# Patient Record
Sex: MALE | Race: WHITE | NOT HISPANIC OR LATINO | Employment: OTHER | URBAN - METROPOLITAN AREA
[De-identification: names, ages, dates, MRNs, and addresses within clinical notes are randomized per-mention and may not be internally consistent; named-entity substitution may affect disease eponyms.]

---

## 2018-09-13 ENCOUNTER — OFFICE VISIT (OUTPATIENT)
Dept: FAMILY MEDICINE | Facility: CLINIC | Age: 68
End: 2018-09-13

## 2018-09-13 VITALS
HEIGHT: 71 IN | SYSTOLIC BLOOD PRESSURE: 144 MMHG | DIASTOLIC BLOOD PRESSURE: 76 MMHG | BODY MASS INDEX: 20.16 KG/M2 | HEART RATE: 80 BPM | RESPIRATION RATE: 16 BRPM | WEIGHT: 144 LBS

## 2018-09-13 DIAGNOSIS — M79.5 FOREIGN BODY (FB) IN SOFT TISSUE: Primary | ICD-10-CM

## 2018-09-13 PROCEDURE — 10121 INC&RMVL FB SUBQ TISS COMP: CPT | Performed by: FAMILY MEDICINE

## 2018-09-13 NOTE — MR AVS SNAPSHOT
"              After Visit Summary   2018    Max Phelps    MRN: [id number]           Patient Information     Date Of Birth          1950        Visit Information        Provider Department      2018 4:30 PM Nayan Samuel MD [facility]        Today's Diagnoses     Foreign body (FB) in soft tissue    -  1       Follow-ups after your visit        Who to contact     If you have questions or need follow up information about today's clinic visit or your schedule please contact [facility] directly at [phone].  Normal or non-critical lab and imaging results will be communicated to you by AltaVitashart, letter or phone within 4 business days after the clinic has received the results. If you do not hear from us within 7 days, please contact the clinic through Wordyt or phone. If you have a critical or abnormal lab result, we will notify you by phone as soon as possible.  Submit refill requests through DataMentors or call your pharmacy and they will forward the refill request to us. Please allow 3 business days for your refill to be completed.          Additional Information About Your Visit        MyChart Information     Data[employer]ent[employer] lets you send messages to your doctor, view your test results, renew your prescriptions, schedule appointments and more. To sign up, go to [email or web address] . Click on \"Log in\" on the left side of the screen, which will take you to the Welcome page. Then click on \"Sign up Now\" on the right side of the page.     You will be asked to enter the access code listed below, as well as some personal information. Please follow the directions to create your username and password.     Your access code is: [email or web address]  Expires: 2018  3:33 PM     Your access code will  in 90 days. If you need help or a new code, please call your [place] clinic or [phone].        Care EveryWhere ID     This is your Care EveryWhere ID. This could be used by " "other organizations to access your [place] medical records  [id number]        Your Vitals Were     Pulse Respirations Height BMI (Body Mass Index)          80 16 1.803 m (5' 11\") 20.08 kg/m2         Blood Pressure from Last 3 Encounters:   09/13/18 144/76   11/13/15 132/84   10/11/13 156/78    Weight from Last 3 Encounters:   09/13/18 65.3 kg (144 lb)   11/13/15 67.6 kg (149 lb)   10/11/13 64.4 kg (142 lb)              We Performed the Following     REMOVE FOREIGN BODY COMPLICATED [[id number]]        Primary Care Provider Office Phone # Fax #    Nayan Samuel [phone] [address]  [facility] [id number]        Equal Access to Services     [facility]: Hadii aad ku hadasho Soomaali, waaxda luqadaha, qaybta kaalmada adeegyada, mary ellen ponce haygualberto brewster . So [facility] [phone].    ATENCIÓN: Si habla español, tiene a fisher disposición servicios gratuitos de asistencia lingüística. Jose al [phone].    We comply with applicable federal civil rights laws and [place] laws. We do not discriminate on the basis of race, color, national origin, age, disability, sex, sexual orientation, or gender identity.            Thank you!     Thank you for choosing [facility]  for your care. Our goal is always to provide you with excellent care. Hearing back from our patients is one way we can continue to improve our services. Please take a few minutes to complete the written survey that you may receive in the mail after your visit with us. Thank you!             Your Updated Medication List - Protect others around you: Learn how to safely use, store and throw away your medicines at www.disposemymeds.org.          This list is accurate as of 9/13/18 11:59 PM.  Always use your most recent med list.                   Brand Name Dispense Instructions for use Diagnosis    ASPIRIN PO      Take 81 mg by mouth five times a week          "

## 2018-09-14 NOTE — PROGRESS NOTES
Subjective: Max Phelps a 68 year old male who presents today for lesion removal. The lesion is located on the right palm,  and measures 2mm.  He denies other significant symptoms on ROS. Medications reviewed.    Objective: The area was prepped and appropriately anesthetized. Using the usual technique, removal of foreign body was performed.  An appropriate  dressing was applied.  The procedure was well tolerated and without complications. Specimen was not sent.    Assessment: foreign body    Plan: Follow up: The patient may return prn. Wound care instructions provided.  Patient was instructed to be alert for any signs of cutaneous infection.

## 2018-10-21 NOTE — PROGRESS NOTES
Problem(s) Oriented visit      SUBJECTIVE:                                                    Max Phelps is a 68 year old male who presents to clinic today for:  Patient presents with:  Hand Problem: Pt states he had gotten glass in his hand in Sept.- Having sharp pain in the palm area, especially when touched     Pt usually sees PCP Dr. Nayan Samuel. He is not available today. Pt is new to me today.     Pt was seen by his PCP on 9/13/2018 for foreign body vs 2 mm lesion removal from his R palm.     Since then, still feels as though there is something left in his hand. It feels rough when he runs his hand over the site. He cannot see if there is a retained foreign body. Has not tried soaking it yet. No erythema. No edema. Some mild localized pain present especially when grasping items.     Problem list, Medication list, Allergies, and Medical/Social/Surgical histories reviewed in EPIC and updated as appropriate.     ROS:  5 point ROS completed and negative except noted above, including Gen, CV, Resp, GI, MS    Histories:   Patient Active Problem List   Diagnosis     Problem with, hearing     No past medical history on file.  Past Surgical History:   Procedure Laterality Date     HERNIA REPAIR       Social History     Tobacco Use     Smoking status: Current Every Day Smoker     Packs/day: 1.30     Types: Cigarettes     Smokeless tobacco: Never Used   Substance Use Topics     Alcohol use: No     Alcohol/week: 0.0 oz     Comment: 10/19/85     No family history on file.    OBJECTIVE:                                                    /80 (BP Location: Right arm, Patient Position: Sitting, Cuff Size: Adult Large)   Pulse 84   Resp 24   Wt 66.2 kg (146 lb)   BMI 20.36 kg/m    Body mass index is 20.36 kg/m .   Gen: Cooperative. No acute distress. Very pleasant. Neatly dressed; well groomed.    Musculoskeletal: Demonstrates full function of R hand.   Skin: Warm and well perfused. There is a very small sharp  edge palpable over the scar from prior foreign body removal on palm of R hand.   Neurologic: Alert, oriented x3, nonfocal. Sensation intact in R hand.      ASSESSMENT/PLAN:                                                      Max was seen today for hand problem.    Diagnoses and all orders for this visit:    Foreign body of right hand, subsequent encounter   After obtaining verbal consent, cleaned the skin over site of prior excision.    We proceeded w/ injecting lido because fearful we would lose the foreign body when the skin raises after injection. He was instructed to let me know at any time if he wanted to abandon procedure.    Gently scraped at skin w/ #11 blade - we could hear foreign body clicking against scalpel blade.   Located site of foreign body and removed < 1 mm of shiny clear object, consistent w/ sliver of glass. Pt inspected the removed object. The shard was discarded.   Pt tolerated process very well.    Cleaned site, dressed w/ Bacitracin and sterile dressing.    Instructed pt to call if any redness or pain or if he continues to feel as if a foreign body is in place.    Wound and trauma process were in a clean environment. He has a documented allergy to tetanus vaccinations.    -     REMOVE FOREIGN BODY SIMPLE     Needs flu shot  -     FLU VACCINE, INCREASED ANTIGEN, PRESV FREE  -     ADMIN INFLUENZA VIRUS VAC   Counseled risks vs benefits of flu shot today. No known complications.     RTC prn.     The following health maintenance items are reviewed in Epic and correct as of today:  Health Maintenance   Topic Date Due     PHQ-2 Q1 YR  07/03/1964     HEPATITIS C SCREENING  07/03/1970     LIPID SCREEN Q5 YR MALE (SYSTEM ASSIGNED)  07/03/1987     COLON CANCER SCREEN (SYSTEM ASSIGNED)  07/03/2002     ZOSTER IMMUNIZATION (1 of 2) 07/03/2002     ADVANCE DIRECTIVE PLANNING Q5 YRS  07/03/2007     FALL RISK ASSESSMENT  07/03/2017     PNEUMOVAX IMMUNIZATION 65+ LOW/MEDIUM RISK (1 of 2 - PCV13)  07/03/2017     AORTIC ANEURYSM SCREENING (SYSTEM ASSIGNED)  07/03/2017     INFLUENZA VACCINE  Completed     IPV IMMUNIZATION  Aged Out     MENINGITIS IMMUNIZATION  Aged Out       Trena Cavanaugh MD  Family Medicine    For any issues, please call my office  [facility] at [phone].

## 2018-10-22 ENCOUNTER — OFFICE VISIT (OUTPATIENT)
Dept: FAMILY MEDICINE | Facility: CLINIC | Age: 68
End: 2018-10-22

## 2018-10-22 VITALS
DIASTOLIC BLOOD PRESSURE: 80 MMHG | BODY MASS INDEX: 20.36 KG/M2 | SYSTOLIC BLOOD PRESSURE: 140 MMHG | WEIGHT: 146 LBS | RESPIRATION RATE: 24 BRPM | HEART RATE: 84 BPM

## 2018-10-22 DIAGNOSIS — S60.551D FOREIGN BODY OF RIGHT HAND, SUBSEQUENT ENCOUNTER: Primary | ICD-10-CM

## 2018-10-22 DIAGNOSIS — Z23 NEEDS FLU SHOT: ICD-10-CM

## 2018-10-22 PROCEDURE — 90662 IIV NO PRSV INCREASED AG IM: CPT | Performed by: FAMILY MEDICINE

## 2018-10-22 PROCEDURE — 10120 INC&RMVL FB SUBQ TISS SMPL: CPT | Mod: 59 | Performed by: FAMILY MEDICINE

## 2018-10-22 PROCEDURE — 90471 IMMUNIZATION ADMIN: CPT | Performed by: FAMILY MEDICINE

## 2018-10-22 PROCEDURE — 99212 OFFICE O/P EST SF 10 MIN: CPT | Mod: 25 | Performed by: FAMILY MEDICINE

## 2021-07-21 ENCOUNTER — OFFICE VISIT (OUTPATIENT)
Dept: FAMILY MEDICINE | Facility: CLINIC | Age: 71
End: 2021-07-21

## 2021-07-21 VITALS
SYSTOLIC BLOOD PRESSURE: 146 MMHG | TEMPERATURE: 97.5 F | OXYGEN SATURATION: 94 % | HEART RATE: 87 BPM | DIASTOLIC BLOOD PRESSURE: 82 MMHG | WEIGHT: 144 LBS | BODY MASS INDEX: 20.62 KG/M2 | HEIGHT: 70 IN

## 2021-07-21 DIAGNOSIS — R03.0 ELEVATED BLOOD PRESSURE READING WITHOUT DIAGNOSIS OF HYPERTENSION: ICD-10-CM

## 2021-07-21 DIAGNOSIS — H61.23 BILATERAL IMPACTED CERUMEN: Primary | ICD-10-CM

## 2021-07-21 PROCEDURE — 99213 OFFICE O/P EST LOW 20 MIN: CPT | Mod: 25 | Performed by: NURSE PRACTITIONER

## 2021-07-21 PROCEDURE — 69209 REMOVE IMPACTED EAR WAX UNI: CPT | Mod: 50 | Performed by: NURSE PRACTITIONER

## 2021-07-21 ASSESSMENT — MIFFLIN-ST. JEOR: SCORE: 1420.46

## 2021-07-21 NOTE — PATIENT INSTRUCTIONS
Come back later this week or earlier next week for us to recheck the ear and try another flush. If this is not successful, we will refer to ENT.    When you come back, let's talk more about the blood pressure as well    Patient Education     High Blood Pressure, To Be Confirmed, No Treatment   Your blood pressure today was higher than normal. Sometimes anxiety, pain, or other issues can cause a short-term rise in blood pressure. It later returns to normal. Blood pressure that is high only one time doesn t mean that you have high blood pressure (hypertension). High blood pressure is a long-term (chronic) illness. But you should have your blood pressure measured again in the next few days to find out if it s still high.     Blood pressure measurements are given as 2 numbers. Systolic blood pressure is the upper number. This is the pressure when the heart contracts. Diastolic blood pressure is the lower number. This is the pressure when the heart relaxes between beats. You will see your blood pressure readings written together. For example, a person with a systolic pressure of 118 and a diastolic pressure of 78 will have 118/78 written in the medical record.   Blood pressure is classified as normal, raised (elevated), or stage 1 or stage 2 high blood pressure:     Normal blood pressure. Systolic of less than 120 and diastolic of less than 80 (120/80).    Elevated blood pressure. Systolic of 120 to 129 and diastolic less than 80.    Stage 1 high blood pressure. Systolic is 130 to 139 or diastolic between 80 to 89.    Stage 2 high blood pressure. Systolic is 140 or higher or the diastolic is 90 or higher.  Lifestyle changes can help manage your blood pressure. These include weight loss, exercise, and quitting smoking. Have your blood pressure checked regularly to be sure it is under control.   Home care  To track your blood pressure, your healthcare provider may ask you to come into the office at different times and on  different days. If your provider asks you to check your readings at home, ask him or her what times of the day to test and for how many days. Before you leave the office, ask your provider to show you how to take your blood pressure. Ask questions if you don't understand something.   Using a home blood pressure monitor  Think about buying an automatic blood pressure monitor. Ask your provider for a recommendation as well as the correct size cuff to fit your arm. You can buy blood pressure monitors at most pharmacies.   The American Heart Association advises the following guidelines for home blood pressure monitoring:     Don't smoke or drink coffee or other caffeinated drinks for 30 minutes before taking your blood pressure.    Go to the bathroom before the test.    Relax for 5 minutes before taking the measurement.    Sit with your back supported (don't sit on a couch or soft chair). Keep your feet on the floor uncrossed. Place your arm on a solid flat surface (like a table) with the upper part of the arm at heart level. Place the middle of the cuff directly above the bend of the elbow. Check the monitor's instruction manual for an illustration.    Take multiple readings. When you measure, take 2 to 3 readings one minute apart. Record all of the results.    Take your blood pressure at the same time every day, or as your provider advises.    Record the date, time, and blood pressure reading.    Take the record with you to your next medical appointment. If your blood pressure monitor has a built-in memory, simply take the monitor with you to your next appointment.    Call your provider if you have several high readings. Don't be frightened by a single high blood pressure reading. But if you get a few high readings, check in with your provider.  Follow-up care  Keep all of your follow-up appointments. If your blood pressure is more than 120 over 80 on 2 out of 3 days, you will need to follow up with your healthcare  provider for more evaluation and treatment.   Don t put this off! High blood pressure can be treated. High blood pressure that s not treated raises your risk for heart attack, heart failure, kidney disease, and stroke.   Call 911  Call 911 if you have any of these:     Blood pressure of 180/120 or higher    Chest pain or shortness of breath    Weakness of an arm or leg or one side of the face    Problems speaking or seeing  When to get medical advice  Call your healthcare provider right away if any of these occur:     Severe headache    Throbbing or rushing sound in the ears    Nosebleed    Sudden severe pain in your belly (abdomen)    Extreme drowsiness, confusion, or fainting    Dizziness or dizziness with spinning feeling (vertigo)  [employer] last reviewed this educational content on 7/1/2019 2000-2021 [employer]. All rights reserved. This information is not intended as a substitute for professional medical care. Always follow your healthcare professional's instructions.

## 2021-07-21 NOTE — PROGRESS NOTES
"Problem(s) Oriented visit        SUBJECTIVE:                                                    Max Phelps is a 71 year old male who presents to clinic today for the following health issues :      Left ear feels plugged, worsening over the past week. Hearing feels muffled and he feels like he is descending on an airplane with fullness in the left ear. No tinnitus, dizziness, fevers, or drainage from the ear. Right ear is less bothersome. Reports hx of cerumen impaction, does use Q tips to clean his ears.    BP elevated in clinic- he states this is normal for him, has never been on treatment. Asymptomatic. Working as a .    Problem list, Medication list, Allergies, and Medical/Social/Surgical histories reviewed in [facility] and updated as appropriate.   Additional history: as documented    ROS:  Gen, HEENT negative except as listed per HPI      OBJECTIVE:                                                    Physical Exam:    BP (!) 146/82   Pulse 87   Temp 97.5  F (36.4  C)   Ht 1.772 m (5' 9.75\")   Wt 65.3 kg (144 lb)   SpO2 94%   BMI 20.81 kg/m      CONSTITUTIONAL: Alert non-toxic appearing male in no acute distress  HEAD: Normocephalic, atraumatic  ENT: Pinna and auricles without erythema, edema, or lesions. No tenderness to manipulation of pinna or palpation of tragus. No pre-auricular or post-auricular adenopathy. B EACS impacted with cerumen. R ear successfully irrigated, TM pearly gray and intact with good visualization of bony landmarks and cone of light, no bulging or erythema *. L EAC with large amount of hard dark cerumen removed with irrigation- small white/green appearing firm material in the inferior aspect of the canal with hard dark cerumen behind this- attempted removal with lit curette without success.  NECK: Neck supple without lymphadenopathy  NEUROLOGIC: No gross deficits, normal gait  SKIN: Appropriate color for race, warm and dry, no rashes or lesions to unclothed " skin  PSYCHIATRIC: Pleasant and interactive, affect bright, makes appropriate eye contact, thought process logical       ASSESSMENT/PLAN:                                                          Max was seen today for ear problem.    Diagnoses and all orders for this visit:    Bilateral impacted cerumen  -     Removal Impacted Cerumen Irrigation Unilateral (Ear Wash)    R ear irrigated without incident. L ear with persistent cerumen and what appears to be foreign body in the EAC, suspect cotton from Qtip- discussed return to clinic for repeat lavage within the next week vs ENT referral, elects to return to clinic. Reports he already notes some improvement in his symptoms. No s/sxs infection. Reviewed when to call.    Elevated blood pressure reading without diagnosis of hypertension    Reports this is consistently elevated- we discussed hypertension and consequences of poorly controlled blood pressure, importance of good control. To recheck blood pressure at next visit and begin treatment if appropriate. Given information on lifestyle changes.              The following health maintenance items are reviewed in Epic and correct as of today:  Health Maintenance   Topic Date Due     ADVANCE CARE PLANNING  Never done     Pneumococcal Vaccine: 65+ Years (1 of 2 - PPSV23) Never done     COLORECTAL CANCER SCREENING  Never done     HEPATITIS C SCREENING  Never done     LIPID  Never done     ZOSTER IMMUNIZATION (1 of 2) Never done     MEDICARE ANNUAL WELLNESS VISIT  Never done     FALL RISK ASSESSMENT  Never done     AORTIC ANEURYSM SCREENING (SYSTEM ASSIGNED)  Never done     INFLUENZA VACCINE (1) 09/01/2021     PHQ-2  Completed     COVID-19 Vaccine  Completed     IPV IMMUNIZATION  Aged Out     MENINGITIS IMMUNIZATION  Aged Out     HEPATITIS B IMMUNIZATION  Aged Out     DTAP/TDAP/TD IMMUNIZATION  Discontinued       Patient Instructions   Come back later this week or earlier next week for us to recheck the ear and try  another flush. If this is not successful, we will refer to ENT.    When you come back, let's talk more about the blood pressure as well    Patient Education     High Blood Pressure, To Be Confirmed, No Treatment   Your blood pressure today was higher than normal. Sometimes anxiety, pain, or other issues can cause a short-term rise in blood pressure. It later returns to normal. Blood pressure that is high only one time doesn t mean that you have high blood pressure (hypertension). High blood pressure is a long-term (chronic) illness. But you should have your blood pressure measured again in the next few days to find out if it s still high.     Blood pressure measurements are given as 2 numbers. Systolic blood pressure is the upper number. This is the pressure when the heart contracts. Diastolic blood pressure is the lower number. This is the pressure when the heart relaxes between beats. You will see your blood pressure readings written together. For example, a person with a systolic pressure of 118 and a diastolic pressure of 78 will have 118/78 written in the medical record.   Blood pressure is classified as normal, raised (elevated), or stage 1 or stage 2 high blood pressure:     Normal blood pressure. Systolic of less than 120 and diastolic of less than 80 (120/80).    Elevated blood pressure. Systolic of 120 to 129 and diastolic less than 80.    Stage 1 high blood pressure. Systolic is 130 to 139 or diastolic between 80 to 89.    Stage 2 high blood pressure. Systolic is 140 or higher or the diastolic is 90 or higher.  Lifestyle changes can help manage your blood pressure. These include weight loss, exercise, and quitting smoking. Have your blood pressure checked regularly to be sure it is under control.   Home care  To track your blood pressure, your healthcare provider may ask you to come into the office at different times and on different days. If your provider asks you to check your readings at home, ask him  or her what times of the day to test and for how many days. Before you leave the office, ask your provider to show you how to take your blood pressure. Ask questions if you don't understand something.   Using a home blood pressure monitor  Think about buying an automatic blood pressure monitor. Ask your provider for a recommendation as well as the correct size cuff to fit your arm. You can buy blood pressure monitors at most pharmacies.   The American Heart Association advises the following guidelines for home blood pressure monitoring:     Don't smoke or drink coffee or other caffeinated drinks for 30 minutes before taking your blood pressure.    Go to the bathroom before the test.    Relax for 5 minutes before taking the measurement.    Sit with your back supported (don't sit on a couch or soft chair). Keep your feet on the floor uncrossed. Place your arm on a solid flat surface (like a table) with the upper part of the arm at heart level. Place the middle of the cuff directly above the bend of the elbow. Check the monitor's instruction manual for an illustration.    Take multiple readings. When you measure, take 2 to 3 readings one minute apart. Record all of the results.    Take your blood pressure at the same time every day, or as your provider advises.    Record the date, time, and blood pressure reading.    Take the record with you to your next medical appointment. If your blood pressure monitor has a built-in memory, simply take the monitor with you to your next appointment.    Call your provider if you have several high readings. Don't be frightened by a single high blood pressure reading. But if you get a few high readings, check in with your provider.  Follow-up care  Keep all of your follow-up appointments. If your blood pressure is more than 120 over 80 on 2 out of 3 days, you will need to follow up with your healthcare provider for more evaluation and treatment.   Don t put this off! High blood  pressure can be treated. High blood pressure that s not treated raises your risk for heart attack, heart failure, kidney disease, and stroke.   Call 911  Call 911 if you have any of these:     Blood pressure of 180/120 or higher    Chest pain or shortness of breath    Weakness of an arm or leg or one side of the face    Problems speaking or seeing  When to get medical advice  Call your healthcare provider right away if any of these occur:     Severe headache    Throbbing or rushing sound in the ears    Nosebleed    Sudden severe pain in your belly (abdomen)    Extreme drowsiness, confusion, or fainting    Dizziness or dizziness with spinning feeling (vertigo)  [employer] last reviewed this educational content on 7/1/2019 2000-2021 [employer]. All rights reserved. This information is not intended as a substitute for professional medical care. Always follow your healthcare professional's instructions.               GARO Aleln CNP  [facility]  Family Practice  [facility]  [phone]    For any issues my office # is [phone]

## 2021-07-27 ENCOUNTER — OFFICE VISIT (OUTPATIENT)
Dept: FAMILY MEDICINE | Facility: CLINIC | Age: 71
End: 2021-07-27

## 2021-07-27 VITALS
WEIGHT: 143.2 LBS | DIASTOLIC BLOOD PRESSURE: 80 MMHG | SYSTOLIC BLOOD PRESSURE: 180 MMHG | TEMPERATURE: 98.1 F | OXYGEN SATURATION: 96 % | BODY MASS INDEX: 20.69 KG/M2 | HEART RATE: 90 BPM

## 2021-07-27 DIAGNOSIS — H61.22 IMPACTED CERUMEN OF LEFT EAR: ICD-10-CM

## 2021-07-27 DIAGNOSIS — I10 ESSENTIAL HYPERTENSION: Primary | ICD-10-CM

## 2021-07-27 PROCEDURE — 69209 REMOVE IMPACTED EAR WAX UNI: CPT | Mod: LT | Performed by: NURSE PRACTITIONER

## 2021-07-27 PROCEDURE — 36415 COLL VENOUS BLD VENIPUNCTURE: CPT | Performed by: NURSE PRACTITIONER

## 2021-07-27 PROCEDURE — 99214 OFFICE O/P EST MOD 30 MIN: CPT | Mod: 25 | Performed by: NURSE PRACTITIONER

## 2021-07-27 RX ORDER — LISINOPRIL 10 MG/1
10 TABLET ORAL DAILY
Qty: 90 TABLET | Refills: 1 | Status: SHIPPED | OUTPATIENT
Start: 2021-07-27 | End: 2022-01-10

## 2021-07-27 NOTE — PATIENT INSTRUCTIONS
Start lisinopril once daily for blood pressure  Www.validateBP.org is a good website to help guide you in the search for a quality blood pressure cuff  Recommend checking blood pressures daily, goal <130/80, please keep a log, and bring back with you to see Marcia in 2-4 weeks

## 2021-07-27 NOTE — PROGRESS NOTES
Problem(s) Oriented visit        SUBJECTIVE:                                                    Max Phelps is a 71 year old male who presents to clinic today for the following health issues :      Follow up of cerumen impaction    Noticed improvement in the left ear fullness after irrigation last week, but continues to have some muffled hearing and fullness. No longer having discomfort in the ear canal.    BP remains elevated today. He states it is consistently elevated and he is open to starting treatment for this. No chest pain or pressure, SOB/MENARD, peripheral edema, or neurologic changes. Does smoke.     Problem list, Medication list, Allergies, and Medical/Social/Surgical histories reviewed in [facility] and updated as appropriate.   Additional history: as documented    ROS:  Gen, HEENT, CV, resp, MSK, neuro negative except as listed per HPI      OBJECTIVE:                                                    Physical Exam:    BP (!) 151/89   Pulse 90   Temp 98.1  F (36.7  C)   Wt 65 kg (143 lb 3.2 oz)   SpO2 96%   BMI 20.69 kg/m      Repeat /80    CONSTITUTIONAL: Alert non-toxic appearing male in no acute distress  HEENT: PERRLA. R EAC clear, TM intact and pearly gray. L EAC occluded with cerumen and a pale white/blue soft matter. Tolerated irrigation without incident, post-irrigation EAC clear and TM pearly gray and intact  RESPIRATORY: Lungs clear to auscultation, respirations unlabored  CV: Regular rate and rhythm, S1S2, no clicks, murmurs, rubs, or gallops; BLE without edema  GASTROINTESTINAL: Abdomen soft, non-distended, and non-tender to palpation  NEUROLOGIC: No gross deficits  PSYCHIATRIC: Pleasant and interactive, affect euthymic, makes appropriate eye contact, thought process logical       ASSESSMENT/PLAN:                                                          Max was seen today for ear problem.    Diagnoses and all orders for this visit:    Essential hypertension  -     lisinopril  (ZESTRIL) 10 MG tablet; Take 1 tablet (10 mg) by mouth daily  -     Basic Metabolic Panel (8) ([facility])  -     VENOUS COLLECTION    New dx HTN- discussed at his visit last week as well, reviewed pathophysiology of HTN and goal of treating to avoid target organ damage. Start lisinopril 10mg daily, reviewed lifestyle changes. Recommend obtaining BP cuff, checking daily with goal <130/80. Recheck with me in 2-4 weeks. Will obtain EKG at that time- he had time constraints today. Reviewed side effects of medications, alarm signs and symptoms, and when to seek further care.    Impacted cerumen of left ear  -     Removal Impacted Cerumen Irrigation Unilateral (Ear Wash)    Resolved with irrigation              The following health maintenance items are reviewed in Epic and correct as of today:  Health Maintenance   Topic Date Due     ADVANCE CARE PLANNING  Never done     Pneumococcal Vaccine: 65+ Years (1 of 2 - PPSV23) Never done     COLORECTAL CANCER SCREENING  Never done     HEPATITIS C SCREENING  Never done     LIPID  Never done     ZOSTER IMMUNIZATION (1 of 2) Never done     MEDICARE ANNUAL WELLNESS VISIT  Never done     AORTIC ANEURYSM SCREENING (SYSTEM ASSIGNED)  Never done     INFLUENZA VACCINE (1) 09/01/2021     FALL RISK ASSESSMENT  07/21/2022     PHQ-2  Completed     COVID-19 Vaccine  Completed     IPV IMMUNIZATION  Aged Out     MENINGITIS IMMUNIZATION  Aged Out     HEPATITIS B IMMUNIZATION  Aged Out     DTAP/TDAP/TD IMMUNIZATION  Discontinued       Patient Instructions   Start lisinopril once daily for blood pressure  Www.validateBP.org is a good website to help guide you in the search for a quality blood pressure cuff  Recommend checking blood pressures daily, goal <130/80, please keep a log, and bring back with you to see Marcia in 2-4 weeks          GARO Allen CNP  [facility]  [facility]  [phone]    For any issues my office # is [phone]

## 2021-07-27 NOTE — LETTER
[facility] [address] [place]  [address]  Phone: [phone]    July 28, 2021      Max Phelps  [address]  [facility] [address]              Dear Max,   Here is a copy of your most recent labs. Your labs are within expected limits without significant abnormalities. Please let me know if you have questions or concerns.     Take care,   Marcia Wolfe CNP       Results for orders placed or performed in visit on 07/27/21   Basic Metabolic Panel (8) ([facility])     Status: Abnormal   Result Value Ref Range    Glucose 106 (H) 65 - 99 mg/dL    Urea Nitrogen 11 8 - 27 mg/dL    Creatinine 0.62 (L) 0.76 - 1.27 mg/dL    eGFR If NonAfricn Am 101 >59 mL/min/1.73    eGFR If Africn Am 117 >59 mL/min/1.73    BUN/Creatinine Ratio 18 10 - 24    Sodium 134 134 - 144 mmol/L    Potassium 4.4 3.5 - 5.2 mmol/L    Chloride 97 96 - 106 mmol/L    Total CO2 25 20 - 29 mmol/L    Calcium 9.6 8.6 - 10.2 mg/dL    Narrative    Performed at:  01 - [facility] [address], [place], [place]  [id number]  : Jose Luis Taveras MD, Phone:  [phone]

## 2021-07-28 LAB
BUN SERPL-MCNC: 11 MG/DL (ref 8–27)
BUN/CREATININE RATIO: 18 (ref 10–24)
CALCIUM SERPL-MCNC: 9.6 MG/DL (ref 8.6–10.2)
CHLORIDE SERPLBLD-SCNC: 97 MMOL/L (ref 96–106)
CREAT SERPL-MCNC: 0.62 MG/DL (ref 0.76–1.27)
EGFR IF AFRICN AM: 117 ML/MIN/1.73
EGFR IF NONAFRICN AM: 101 ML/MIN/1.73
GLUCOSE SERPL-MCNC: 106 MG/DL (ref 65–99)
POTASSIUM SERPL-SCNC: 4.4 MMOL/L (ref 3.5–5.2)
SODIUM SERPL-SCNC: 134 MMOL/L (ref 134–144)
TOTAL CO2: 25 MMOL/L (ref 20–29)

## 2022-01-09 DIAGNOSIS — I10 ESSENTIAL HYPERTENSION: ICD-10-CM

## 2022-01-10 RX ORDER — LISINOPRIL 10 MG/1
TABLET ORAL
Qty: 90 TABLET | Refills: 1 | Status: SHIPPED | OUTPATIENT
Start: 2022-01-10 | End: 2022-07-11

## 2022-07-10 DIAGNOSIS — I10 ESSENTIAL HYPERTENSION: ICD-10-CM

## 2022-07-11 RX ORDER — LISINOPRIL 10 MG/1
TABLET ORAL
Qty: 90 TABLET | Refills: 1 | Status: SHIPPED | OUTPATIENT
Start: 2022-07-11 | End: 2023-01-03

## 2022-07-29 NOTE — NURSING NOTE
"Injectable Influenza Immunization Documentation    1.  Has the patient received the information for the injectable influenza vaccine? YES     2. Is the patient 6 months of age or older? YES     3. Does the patient have any of the following contraindications?         Severe allergy to eggs? No     Severe allergic reaction to previous influenza vaccines? No   Severe allergy to latex? No       History of Guillain-[place] syndrome? No     Currently have a temperature greater than 100.4F? No        4.  Severely egg allergic patients should have flu vaccine eligibility assessed by an MD, RN, or pharmacist, and those who received flu vaccine should be observed for 15 min by an MD, RN, Pharmacist, Medical Technician, or member of clinic staff.\": YES    5. Latex-allergic patients should be given latex-free influenza vaccine Yes. Please reference the Vaccine latex table to determine if your clinic s product is latex-containing.       Vaccination given by Josue Cordoba LPN      10/22/2018    3:44 PM          "
General: no weight change, no fever, no chills, no night sweats, no fatigue  Skin: no rash, no itching, no dryness, no hair loss  Opthalmologic: no eye pain, no eye redness, no eye swelling, no vision changes  ENMT: no hearing changes, no ear pain, no tinnitus, no vertigo, no nasal congestion, no sore throat, no dysphagia  Respiratory and thorax: no shortness of breath, no cough, no wheezing, no hemoptysis, no pleuritic chest pain  Cardiovascular: no chest pain, no dyspnea on exertion, no orthopnea, no palpitations, no peripheral edema  Gastrointestinal: no abdominal pain, no nausea, no vomiting, no diarrhea, no constipation  Genitourinary: no dysuria, no urinary frequency or hesitancy, no hematuria  Musculoskeletal: no pain, no lower extremity edema, no traumatic injury  Neurological: no weakness, no numbness, no loss of consciousness, no syncope, no dizziness, no headache  Psychiatric: no depression, no anxiety, no mood swings  Endo: no heat or cold intolerance, no hirsutism, no polyuria, no polydipsia

## 2022-09-24 ENCOUNTER — TELEPHONE (OUTPATIENT)
Dept: FAMILY MEDICINE | Facility: CLINIC | Age: 72
End: 2022-09-24

## 2022-09-24 DIAGNOSIS — U07.1 INFECTION DUE TO 2019 NOVEL CORONAVIRUS: Primary | ICD-10-CM

## 2022-09-24 RX ORDER — BENZONATATE 100 MG/1
100 CAPSULE ORAL 3 TIMES DAILY PRN
Qty: 30 CAPSULE | Refills: 1 | Status: SHIPPED | OUTPATIENT
Start: 2022-09-24 | End: 2022-09-24

## 2022-09-24 RX ORDER — BENZONATATE 100 MG/1
100 CAPSULE ORAL 3 TIMES DAILY PRN
Qty: 30 CAPSULE | Refills: 1 | Status: SHIPPED | OUTPATIENT
Start: 2022-09-24 | End: 2024-09-27

## 2022-09-25 NOTE — TELEPHONE ENCOUNTER
Max tested positive for COVID yesterday, symptoms began at that time as well. Smokes 4 packs per day, has hypertension. Having a lot of coughing, fevers right now. Would like treatment with Paxlovid. Reviewed med list, renal function, weight, comorbid conditions. Meets criteria for Paxlovid. Will also send in benzonatate for cough. Reviewed side effects of medications, alarm signs and symptoms, and when to seek further care. Reviewed information with Max and his wife Tasneem.  Marcia Wolfe [facility]

## 2022-10-19 NOTE — PROGRESS NOTES
[facility]  [address] [place] [address]  Phone: [phone]  Fax: [phone]  Primary Provider: Negrito Samuel  Pre-op Performing Provider: NEGRITO SAMUEL      PREOPERATIVE EVALUATION:  Today's date: 10/20/2022  Preoperative Questionnaire:   No - Have you ever had a heart attack or stroke?  No - Have you ever had surgery on your heart or blood vessels, such as a stent, coronary (heart) bypass, or surgery on an artery in the head, neck, heart, or legs?  No - Do you have chest pain when you are physically active?  No - Do you have a history of heart failure?  No - Do you currently have a cold, bronchitis, or symptoms of other respiratory (head and chest) infections?  No - Do you have a cough, shortness of breath, or wheezing?  Yes - Do you or anyone in your family have a history of blood clots? grandfather  No - Do you or anyone in your family have a serious bleeding problem, such as long-lasting bleeding after surgeries or cuts?  No - Have you ever had anemia or been told to take iron pills?  No - Have you had any abnormal blood loss such as black, tarry or bloody stools, or abnormal vaginal bleeding?  No - Have you ever had a blood transfusion?  Yes - Are you willing to have a blood transfusion if it is medically needed before, during, or after your surgery?  No - Have you or anyone in your family ever had problems with anesthesia (sedation for surgery)?  No - Do you have sleep apnea, excessive snoring, or daytime drowsiness?   No - Do you have any artifical heart valves or other implanted medical devices, such as a pacemaker, defibrillator, or continuous glucose monitor?  No - Do you have any artifical joints?  No - Are you allergic to latex?  No - Is there any chance that you may be pregnant?          Max Phelps is a 72 year old male who presents for a preoperative evaluation.    Surgical Information:  Surgery/Procedure: Cataracts  Surgery Location: [place]  [facility]   Surgeon: Burton Harper  Surgery Date: right 10/31/22, left 11/14/22  Time of Surgery: TBD  Where patient plans to recover: At home with family  Fax number for surgical facility: [phone]    Type of Anesthesia Anticipated: to be determined    Assessment & Plan     The proposed surgical procedure is considered LOW risk.    Preop general physical exam  For  Age-related nuclear cataract of both eyes      As is reasonable care for most folks, In the short term, he wants usual aggressive medical care.   No desire for long term prolongation of life through artificial means if no hope to bring back to a reasonable status.      Essential hypertension  A little high today, he will follow at home.  Will return for fasting work- CBC with Diff/Plt ([facility])  - Comp. Metabolic Panel (14) ([facility])  - Lipid Panel ([facility])    Screening for colon cancer  due  - COLOGUARD([employer])    Screening for prostate cancer  due  - PSA Serum ([facility])    Will screen for US Aorta and low dose CT of chest due to smoking.        Risks and Recommendations:  The patient has the following additional risks and recommendations for perioperative complications:   - No identified additional risk factors other than previously addressed    Medication Instructions:  Patient is to take all scheduled medications on the day of surgery    RECOMMENDATION:  APPROVAL GIVEN to proceed with proposed procedure, without further diagnostic evaluation.    Review of external notes as documented above           Subjective     HPI related to upcoming procedure: mature cataracts bilateral now ready for intervention      Health Care Directive:  Patient does not have a Health Care Directive or Living Will: As is reasonable care for most folks, In the short term, he wants usual aggressive medical care.   No desire for long term prolongation of life through artificial means if no hope to bring back to a reasonable status.      Preoperative Review of  ":   reviewed - no record of controlled substances prescribed.      Status of Chronic Conditions:  See problem list for active medical problems.  Problems all longstanding and stable, except as noted/documented.  See ROS for pertinent symptoms related to these conditions.      Review of Systems  Constitutional, neuro, ENT, endocrine, pulmonary, cardiac, gastrointestinal, genitourinary, musculoskeletal, integument and psychiatric systems are negative, except as otherwise noted.    Patient Active Problem List    Diagnosis Date Noted     Essential hypertension 10/20/2022     Priority: Medium     Bilateral impacted cerumen 07/21/2021     Priority: Medium     Elevated blood pressure reading without diagnosis of hypertension 07/21/2021     Priority: Medium     Problem with, hearing 10/11/2013     Priority: Medium      No past medical history on file.  Past Surgical History:   Procedure Laterality Date     HERNIA REPAIR       Current Outpatient Medications   Medication Sig Dispense Refill     lisinopril (ZESTRIL) 10 MG tablet TAKE 1 TABLET BY MOUTH DAILY 90 tablet 1     benzonatate (TESSALON) 100 MG capsule Take 1 capsule (100 mg) by mouth 3 times daily as needed for cough (Patient not taking: Reported on 10/20/2022) 30 capsule 1       Allergies   Allergen Reactions     Diphtheria Toxoid-Containing Vaccines      DPT        Social History     Tobacco Use     Smoking status: Every Day     Packs/day: 1.30     Types: Cigarettes     Smokeless tobacco: Never   Substance Use Topics     Alcohol use: No     Comment: sobriety since 1985     Family History   Problem Relation Age of Onset     Dementia Mother      Parkinsonism Father      Leukemia Father      Heart Disease Sister      History   Drug Use No         Objective     BP (!) 159/76   Pulse 83   Temp 98  F (36.7  C) (Oral)   Resp 16   Ht 1.772 m (5' 9.75\")   Wt 61.2 kg (135 lb)   SpO2 98%   BMI 19.51 kg/m      Physical Exam    GENERAL APPEARANCE: healthy, alert and " no distress     EYES: EOMI,  PERRL     HENT: ear canals and TM's normal and nose and mouth without ulcers or lesions     NECK: no adenopathy, no asymmetry, masses, or scars and thyroid normal to palpation     RESP: lungs clear to auscultation - no rales, rhonchi or wheezes     CV: regular rates and rhythm, normal S1 S2, no S3 or S4 and no murmur, click or rub     ABDOMEN:  soft, nontender, no HSM or masses and bowel sounds normal     MS: extremities normal- no gross deformities noted, no evidence of inflammation in joints, FROM in all extremities.     SKIN: no suspicious lesions or rashes     NEURO: Normal strength and tone, sensory exam grossly normal, mentation intact and speech normal     PSYCH: mentation appears normal. and affect normal/bright     LYMPHATICS: No cervical adenopathy    Recent Labs   Lab Test 07/27/21  1510      POTASSIUM 4.4   CR 0.62*        Diagnostics:  No labs were ordered during this visit.   No EKG required for low risk surgery (cataract, skin procedure, breast biopsy, etc).    Revised Cardiac Risk Index (RCRI):  The patient has the following serious cardiovascular risks for perioperative complications:   - No serious cardiac risks = 0 points     RCRI Interpretation: 0 points: Class I (very low risk - 0.4% complication rate)           Signed Electronically by: Nayan Samuel MD  Copy of this evaluation report is provided to requesting physician.

## 2022-10-19 NOTE — PATIENT INSTRUCTIONS
Use your BP cuff.  Keep a log of the results,  Come back in 2-3 months and bring the cuff so we can validate the accuracy.    Hypertension   What is hypertension?   Hypertension is blood pressure that keeps being higher than normal. Blood pressure is the force of blood against artery walls as the heart pumps blood through the body. Blood pressure can be unhealthy if it is above 120/80. The higher your blood pressure, the greater the health risk.   High blood pressure can be controlled if you take these steps:   Maintain a healthy weight.   Are physically active.   Follow a healthy eating plan, which includes foods that do not have a lot of salt and sodium.   Do not drink a lot of alcohol.   Our goal is to keep the systolic (top) number 128 or lower and the diastolic (bottom) number 83 or lower      Preparing for Your Surgery  Getting started  A nurse will call you to review your health history and instructions. They will give you an arrival time based on your scheduled surgery time. Please be ready to share:  Your doctor's clinic name and phone number  Your medical, surgical and anesthesia history  A list of allergies and sensitivities  A list of medicines, including herbal treatments and over-the-counter drugs  Whether the patient has a legal guardian (ask how to send us the papers in advance)  Please tell us if you're pregnant--or if there's any chance you might be pregnant. Some surgeries may injure a fetus (unborn baby), so they require a pregnancy test. Surgeries that are safe for a fetus don't always need a test, and you can choose whether to have one.   If you have a child who's having surgery, please ask for a copy of Preparing for Your Child's Surgery.    Preparing for surgery  Within 10 to 30 days of surgery: Have a pre-op exam (sometimes called an H&P, or History and Physical). This can be done at a clinic or pre-operative center.  If you're having a , you may not need this exam. Talk to your  care team.  At your pre-op exam, talk to your care team about all medicines you take. If you need to stop any medicines before surgery, ask when to start taking them again.  We do this for your safety. Many medicines can make you bleed too much during surgery. Some change how well surgery (anesthesia) drugs work.  Call your insurance company to let them know you're having surgery. (If you don't have insurance, call [phone].)  Call your clinic if there's any change in your health. This includes signs of a cold or flu (sore throat, runny nose, cough, rash, fever). It also includes a scrape or scratch near the surgery site.  If you have questions on the day of surgery, call your hospital or surgery center.  COVID testing  You may need to be tested for COVID-19 before having surgery. If so, we will give you instructions (or click here).  Eating and drinking guidelines  For your safety: Unless your surgeon tells you otherwise, follow the guidelines below.  Eat and drink as usual until 8 hours before surgery. After that, no food or milk.  Drink clear liquids until 2 hours before surgery. These are liquids you can see through, like water, Gatorade and Propel Water. You may also have black coffee and tea (no cream or milk).  Nothing by mouth within 2 hours of surgery. This includes gum, candy and breath mints.  If you drink alcohol: Stop drinking it the night before surgery.  If your care team tells you to take medicine on the morning of surgery, it's okay to take it with a sip of water.  Preventing infection  Shower or bathe the night before and morning of your surgery. Follow the instructions your clinic gave you. (If no instructions, use regular soap.)  Don't shave or clip hair near your surgery site. We'll remove the hair if needed.  Don't smoke or vape the morning of surgery. You may chew nicotine gum up to 2 hours before surgery. A nicotine patch is okay.  Note: Some surgeries require you to completely quit  smoking and nicotine. Check with your surgeon.  Your care team will make every effort to keep you safe from infection. We will:  Clean our hands often with soap and water (or an alcohol-based hand rub).  Clean the skin at your surgery site with a special soap that kills germs.  Give you a special gown to keep you warm. (Cold raises the risk of infection.)  Wear special hair covers, masks, gowns and gloves during surgery.  Give antibiotic medicine, if prescribed. Not all surgeries need antibiotics.  What to bring on the day of surgery  Photo ID and insurance card  Copy of your health care directive, if you have one  Glasses and hearing aides (bring cases)  You can't wear contacts during surgery  Inhaler and eye drops, if you use them (tell us about these when you arrive)  CPAP machine or breathing device, if you use them  A few personal items, if spending the night  If you have . . .  A pacemaker, ICD (cardiac defibrillator) or other implant: Bring the ID card.  An implanted stimulator: Bring the remote control.  A legal guardian: Bring a copy of the certified (court-stamped) guardianship papers.  Please remove any jewelry, including body piercings. Leave jewelry and other valuables at home.  If you're going home the day of surgery  You must have a responsible adult drive you home. They should stay with you overnight as well.  If you don't have someone to stay with you, and you aren't safe to go home alone, we may keep you overnight. Insurance often won't pay for this.  After surgery  If it's hard to control your pain or you need more pain medicine, please call your surgeon's office.  Questions?   If you have any questions for your care team, list them here: _________________________________________________________________________________________________________________________________________________________________________ ____________________________________ ____________________________________  ____________________________________  For informational purposes only. Not to replace the advice of your health care provider. Copyright   2003, 2019 [facility]. All rights reserved. Clinically reviewed by Yesica Kirk MD. [employer] [id number] - REV 07/22.

## 2022-10-20 ENCOUNTER — OFFICE VISIT (OUTPATIENT)
Dept: FAMILY MEDICINE | Facility: CLINIC | Age: 72
End: 2022-10-20

## 2022-10-20 VITALS
HEIGHT: 70 IN | OXYGEN SATURATION: 98 % | DIASTOLIC BLOOD PRESSURE: 76 MMHG | WEIGHT: 135 LBS | RESPIRATION RATE: 16 BRPM | TEMPERATURE: 98 F | BODY MASS INDEX: 19.33 KG/M2 | HEART RATE: 83 BPM | SYSTOLIC BLOOD PRESSURE: 159 MMHG

## 2022-10-20 DIAGNOSIS — I10 ESSENTIAL HYPERTENSION: ICD-10-CM

## 2022-10-20 DIAGNOSIS — Z12.11 SCREENING FOR COLON CANCER: ICD-10-CM

## 2022-10-20 DIAGNOSIS — Z01.818 PREOP GENERAL PHYSICAL EXAM: Primary | ICD-10-CM

## 2022-10-20 DIAGNOSIS — H25.13 AGE-RELATED NUCLEAR CATARACT OF BOTH EYES: ICD-10-CM

## 2022-10-20 DIAGNOSIS — Z71.6 ENCOUNTER FOR SMOKING CESSATION COUNSELING: ICD-10-CM

## 2022-10-20 DIAGNOSIS — Z12.5 SCREENING FOR PROSTATE CANCER: ICD-10-CM

## 2022-10-20 PROCEDURE — 99215 OFFICE O/P EST HI 40 MIN: CPT | Performed by: FAMILY MEDICINE

## 2022-10-30 NOTE — PROGRESS NOTES
10/20/22 faxed this [place] [facility] @ [phone]    Peterson Freedman,   [facility]  [phone]  preop to

## 2022-10-30 NOTE — PROGRESS NOTES
10/27/22 faxed this preop to [facility] @ [phone]    Peterson Freedman,   [facility]  [phone]

## 2022-11-10 ENCOUNTER — TELEPHONE (OUTPATIENT)
Dept: FAMILY MEDICINE | Facility: CLINIC | Age: 72
End: 2022-11-10

## 2022-11-10 NOTE — TELEPHONE ENCOUNTER
Left message to call back [facility]  November 10, 2022  Leigh Norris MA  Due for fasting labs from Oct appt with [facility]    Hydrate well  Orders in epic from [facility]

## 2022-11-10 NOTE — TELEPHONE ENCOUNTER
11/10/22 Patient returned Leigh's call.  Patient will call back to schedule this appointment for labs.  He has cataract surgery on Monday and his wife just had some surgery.    Peterson Freedman,   [facility]  [phone]

## 2023-01-02 DIAGNOSIS — I10 ESSENTIAL HYPERTENSION: ICD-10-CM

## 2023-01-03 RX ORDER — LISINOPRIL 10 MG/1
TABLET ORAL
Qty: 90 TABLET | Refills: 1 | Status: SHIPPED | OUTPATIENT
Start: 2023-01-03 | End: 2023-06-18

## 2023-06-17 DIAGNOSIS — I10 ESSENTIAL HYPERTENSION: ICD-10-CM

## 2023-06-18 RX ORDER — LISINOPRIL 10 MG/1
TABLET ORAL
Qty: 90 TABLET | Refills: 1 | Status: SHIPPED | OUTPATIENT
Start: 2023-06-18 | End: 2024-01-01

## 2023-06-26 ENCOUNTER — TELEPHONE (OUTPATIENT)
Dept: FAMILY MEDICINE | Facility: CLINIC | Age: 73
End: 2023-06-26

## 2023-06-26 NOTE — TELEPHONE ENCOUNTER
PC to pt to see if he'd followed up with CT of Chest and US of abdominal aorta. Pt stated he did not but would like to keep them active still. He will contact clinic tomorrow or as he is able.    MIGUEL Crespo  , Care Coordination  [facility]  Cell: [phone]  Clinic: [phone]  [email or web address]

## 2023-12-31 DIAGNOSIS — I10 ESSENTIAL HYPERTENSION: ICD-10-CM

## 2024-01-01 RX ORDER — LISINOPRIL 10 MG/1
TABLET ORAL
Qty: 90 TABLET | Refills: 1 | Status: SHIPPED | OUTPATIENT
Start: 2024-01-01 | End: 2024-07-01

## 2024-06-06 ENCOUNTER — OFFICE VISIT (OUTPATIENT)
Dept: FAMILY MEDICINE | Facility: CLINIC | Age: 74
End: 2024-06-06

## 2024-06-06 VITALS
DIASTOLIC BLOOD PRESSURE: 78 MMHG | SYSTOLIC BLOOD PRESSURE: 156 MMHG | BODY MASS INDEX: 18.79 KG/M2 | HEART RATE: 85 BPM | WEIGHT: 134.2 LBS | HEIGHT: 71 IN | OXYGEN SATURATION: 99 %

## 2024-06-06 DIAGNOSIS — I10 ESSENTIAL HYPERTENSION: ICD-10-CM

## 2024-06-06 DIAGNOSIS — H61.21 IMPACTED CERUMEN OF RIGHT EAR: Primary | ICD-10-CM

## 2024-06-06 PROCEDURE — 99214 OFFICE O/P EST MOD 30 MIN: CPT | Mod: 25

## 2024-06-06 PROCEDURE — 69209 REMOVE IMPACTED EAR WAX UNI: CPT | Mod: RT

## 2024-06-06 NOTE — PATIENT INSTRUCTIONS
EARWAX (Treated)        Everyone produces earwax from the lining of the ear canal. It serves to lubricate and protect the ear. The wax that forms in the canal slowly moves toward the outside of the ear and falls out. Sometimes there will be a build-up of wax in the ear canal causing a blockage and loss of hearing. An ear wax buildup was removed from your ear today.  HOME CARE  Preventing Future Problems   If you have a tendency to build up wax in the ear canal, you should clear the wax at home on a regular basis (about once every six months ) before it causes discomfort.  Unless a prescription medicine was given, you may use an over-the-counter product made for clearing earwax (such as Debrox or Murine Earwax Drops). These contain carbamide peroxide and are available over-the-counter in a kit with a small bulb syringe.  To use: lie down with the blocked ear facing upward. Apply one dropper full of medicine and wait a few minutes. Wiggle the outer ear to get the solution to enter the canal.  Lean over a sink or basin with the blocked ear turned downward. Use a rubber bulb syringe filled with LUKEWARM water to rinse the ear several times. Use gentle pressure only. You may need to repeat the irrigation several times before the wax flows out.  If you are having trouble draining all of the water out of your ear canal after this procedure, you may put a few drops of rubbing alcohol into the ear canal. This will help evaporate the remaining water.  DO NOT  DO NOT use cold water to rinse the ear since this will make you dizzy.  DO NOT perform this procedure if you have an ear infection (ear pain, fever, or fluid draining from the ear).  DO NOT perform this procedure if you have a punctured eardrum.  DO NOT use cotton applicators (Q-tips), matches, toothpicks, juan pins, keys or other objects to  clean  the ear canal. This can cause infection of the ear canal or rupture of the eardrum. Because of their size and shape, it  is common for cotton applicators to push the ear wax deeper into the ear canal instead of removing it. This can make matters worse.  FOLLOW UP with your doctor or this facility as directed by our staff.  GET PROMPT MEDICAL ATTENTION if any of the following occur:  Worsening ear pain  Fever of 100.4 F (38 C) or higher, or as directed by your healthcare provider  Hearing does not return to normal after three days of treatment  Fluid drainage or bleeding from the ear canal  Swelling, redness or tenderness of the outer ear  Headache, neck pain or stiff neck     How to take home blood pressure:  Sit for 5 minutes with feet flat on the floor.  Apply cuff to upper arm (bicep area) and have this arm resting at about heart level.  Take blood pressure reading.  Make sure that the machine has a memory bank that records your readings or write readings down  If you remain elevated, greater than 140/90 then please return to clinic for further high blood pressure work up.

## 2024-06-06 NOTE — PROGRESS NOTES
Assessment & Plan     Impacted cerumen of right ear  Ear ear wax was successfully removed from right ear canal. Patient tolerated procedure well. Discussed OTC debrox as needed. Follow up as needed for new or worsening symptoms. Patient agreeable to plan. All questions answered.     - WI REMOVAL IMPACTED CERUMEN IRRIGATION/LVG UNILAT    Essential hypertension  BP above goal today. Taking Lisinopril 10 mg daily. Recommendations include monitoring blood pressures over the next 1-2 weeks and if blood pressure continues to be elevated over 140/90 recommend follow up for a blood pressure management visit. Discussed importance of a healthy weight, along with diet and exercise. Patient agreeable to plan. All questions answered.                Nicotine/Tobacco Cessation  He reports that he has been smoking cigarettes. He has never used smokeless tobacco.  Nicotine/Tobacco Cessation Plan  Information offered: Patient not interested at this time        See Patient Instructions    Return in about 2 weeks (around 6/20/2024), or if symptoms worsen or fail to improve, for Follow up.    Hailee Santana is a 73 year old, presenting for the following health issues:  Ear Cleaning (Ear wax buildup, hearing is noticeably affected )    HPI       Concern - Ear problem  Onset: last Wednesday: 1 week ago. Didn't clear after the shower  Description: Ear wax build up on both ears, worse on right  Intensity: no pain  Progression of Symptoms:  same  Accompanying Signs & Symptoms: decrease in hearing noticeably on right   Previous history of similar problem: 1-2 years needs wax cleaned  Precipitating factors:        Worsened by: none  Alleviating factors:        Improved by: none  Therapies tried and outcome: Debrox    BP: Taking Lisinopril 10 mg daily. States he had too much coffee today.  BP Readings from Last 6 Encounters:   06/06/24 (!) 156/78   10/20/22 (!) 159/76   07/27/21 (!) 180/80   07/21/21 (!) 146/82   10/22/18 140/80  "  09/13/18 144/76        Review of Systems  Constitutional, HEENT, cardiovascular, pulmonary, gi and gu systems are negative, except as otherwise noted.      Objective    BP (!) 156/78   Pulse 85   Ht 1.803 m (5' 11\")   Wt 60.9 kg (134 lb 3.2 oz)   SpO2 99%   BMI 18.72 kg/m    Body mass index is 18.72 kg/m .  Physical Exam   GENERAL: alert and no distress  HENT: normal cephalic/atraumatic, right ear: occluded with wax, and left ear: normal: no effusions, no erythema, normal landmarks  RESP: lungs clear to auscultation - no rales, rhonchi or wheezes  CV: regular rate and rhythm, normal S1 S2, no S3 or S4, no murmur, click or rub, no peripheral edema  PSYCH: mentation appears normal, affect normal/bright          Signed Electronically by: GARO Guzman CNP    "

## 2024-06-30 DIAGNOSIS — I10 ESSENTIAL HYPERTENSION: ICD-10-CM

## 2024-07-01 RX ORDER — LISINOPRIL 10 MG/1
TABLET ORAL
Qty: 90 TABLET | Refills: 1 | Status: SHIPPED | OUTPATIENT
Start: 2024-07-01

## 2024-07-01 NOTE — CONFIDENTIAL NOTE
Med: LISINOPRIL    LOV (related): 6/6/24    Last Lab: 7/27/2021      Due for F/U around: OVERDUE FOR CPX    Next Appt: NONE        BP Readings from Last 3 Encounters:   06/06/24 (!) 156/78   10/20/22 (!) 159/76   07/27/21 (!) 180/80       Last Comprehensive Metabolic Panel:  Lab Results   Component Value Date     07/27/2021    POTASSIUM 4.4 07/27/2021    CHLORIDE 97 07/27/2021     (H) 07/27/2021    BUN 11 07/27/2021    BUN 18 07/27/2021    CR 0.62 (L) 07/27/2021    EDENILSON 9.6 07/27/2021

## 2024-09-27 ENCOUNTER — HOSPITAL ENCOUNTER (EMERGENCY)
Facility: CLINIC | Age: 74
Discharge: HOME | End: 2024-09-27
Attending: EMERGENCY MEDICINE | Admitting: EMERGENCY MEDICINE
Payer: COMMERCIAL

## 2024-09-27 ENCOUNTER — OFFICE VISIT (OUTPATIENT)
Dept: FAMILY MEDICINE | Facility: CLINIC | Age: 74
End: 2024-09-27

## 2024-09-27 ENCOUNTER — APPOINTMENT (OUTPATIENT)
Dept: CT IMAGING | Facility: CLINIC | Age: 74
End: 2024-09-27
Attending: EMERGENCY MEDICINE
Payer: COMMERCIAL

## 2024-09-27 VITALS
WEIGHT: 133.8 LBS | DIASTOLIC BLOOD PRESSURE: 94 MMHG | OXYGEN SATURATION: 99 % | HEART RATE: 87 BPM | BODY MASS INDEX: 18.66 KG/M2 | SYSTOLIC BLOOD PRESSURE: 124 MMHG

## 2024-09-27 VITALS
TEMPERATURE: 98.4 F | RESPIRATION RATE: 18 BRPM | DIASTOLIC BLOOD PRESSURE: 83 MMHG | SYSTOLIC BLOOD PRESSURE: 215 MMHG | OXYGEN SATURATION: 99 % | HEART RATE: 82 BPM

## 2024-09-27 DIAGNOSIS — I10 HYPERTENSION, UNSPECIFIED TYPE: ICD-10-CM

## 2024-09-27 DIAGNOSIS — S16.1XXA CERVICAL STRAIN, INITIAL ENCOUNTER: ICD-10-CM

## 2024-09-27 DIAGNOSIS — S09.90XA CLOSED HEAD INJURY, INITIAL ENCOUNTER: ICD-10-CM

## 2024-09-27 DIAGNOSIS — M43.6 NECK STIFFNESS: ICD-10-CM

## 2024-09-27 DIAGNOSIS — R51.9 NONINTRACTABLE HEADACHE, UNSPECIFIED CHRONICITY PATTERN, UNSPECIFIED HEADACHE TYPE: ICD-10-CM

## 2024-09-27 DIAGNOSIS — S09.90XA HEAD INJURY, INITIAL ENCOUNTER: Primary | ICD-10-CM

## 2024-09-27 PROBLEM — R03.0 ELEVATED BLOOD PRESSURE READING WITHOUT DIAGNOSIS OF HYPERTENSION: Status: RESOLVED | Noted: 2021-07-21 | Resolved: 2024-09-27

## 2024-09-27 LAB
ANION GAP SERPL CALCULATED.3IONS-SCNC: 11 MMOL/L (ref 7–15)
ATRIAL RATE - MUSE: 79 BPM
BASOPHILS # BLD AUTO: 0.1 10E3/UL (ref 0–0.2)
BASOPHILS NFR BLD AUTO: 1 %
BUN SERPL-MCNC: 6.6 MG/DL (ref 8–23)
CALCIUM SERPL-MCNC: 9.3 MG/DL (ref 8.8–10.4)
CHLORIDE SERPL-SCNC: 93 MMOL/L (ref 98–107)
CREAT SERPL-MCNC: 0.59 MG/DL (ref 0.67–1.17)
DIASTOLIC BLOOD PRESSURE - MUSE: NORMAL MMHG
EGFRCR SERPLBLD CKD-EPI 2021: >90 ML/MIN/1.73M2
EOSINOPHIL # BLD AUTO: 0.1 10E3/UL (ref 0–0.7)
EOSINOPHIL NFR BLD AUTO: 1 %
ERYTHROCYTE [DISTWIDTH] IN BLOOD BY AUTOMATED COUNT: 12.8 % (ref 10–15)
GLUCOSE SERPL-MCNC: 131 MG/DL (ref 70–99)
HCO3 SERPL-SCNC: 25 MMOL/L (ref 22–29)
HCT VFR BLD AUTO: 39.8 % (ref 40–53)
HGB BLD-MCNC: 13.6 G/DL (ref 13.3–17.7)
HOLD SPECIMEN: NORMAL
IMM GRANULOCYTES # BLD: 0 10E3/UL
IMM GRANULOCYTES NFR BLD: 0 %
INTERPRETATION ECG - MUSE: NORMAL
LYMPHOCYTES # BLD AUTO: 1.6 10E3/UL (ref 0.8–5.3)
LYMPHOCYTES NFR BLD AUTO: 15 %
MCH RBC QN AUTO: 31.8 PG (ref 26.5–33)
MCHC RBC AUTO-ENTMCNC: 34.2 G/DL (ref 31.5–36.5)
MCV RBC AUTO: 93 FL (ref 78–100)
MONOCYTES # BLD AUTO: 1 10E3/UL (ref 0–1.3)
MONOCYTES NFR BLD AUTO: 9 %
NEUTROPHILS # BLD AUTO: 7.7 10E3/UL (ref 1.6–8.3)
NEUTROPHILS NFR BLD AUTO: 74 %
NRBC # BLD AUTO: 0 10E3/UL
NRBC BLD AUTO-RTO: 0 /100
P AXIS - MUSE: 78 DEGREES
PLATELET # BLD AUTO: 224 10E3/UL (ref 150–450)
POTASSIUM SERPL-SCNC: 4.2 MMOL/L (ref 3.4–5.3)
PR INTERVAL - MUSE: 162 MS
QRS DURATION - MUSE: 86 MS
QT - MUSE: 360 MS
QTC - MUSE: 412 MS
R AXIS - MUSE: 84 DEGREES
RBC # BLD AUTO: 4.28 10E6/UL (ref 4.4–5.9)
SODIUM SERPL-SCNC: 129 MMOL/L (ref 135–145)
SYSTOLIC BLOOD PRESSURE - MUSE: NORMAL MMHG
T AXIS - MUSE: 50 DEGREES
TROPONIN T SERPL HS-MCNC: 10 NG/L
VENTRICULAR RATE- MUSE: 79 BPM
WBC # BLD AUTO: 10.4 10E3/UL (ref 4–11)

## 2024-09-27 PROCEDURE — 80048 BASIC METABOLIC PNL TOTAL CA: CPT | Performed by: EMERGENCY MEDICINE

## 2024-09-27 PROCEDURE — G2211 COMPLEX E/M VISIT ADD ON: HCPCS | Performed by: FAMILY MEDICINE

## 2024-09-27 PROCEDURE — 99285 EMERGENCY DEPT VISIT HI MDM: CPT | Mod: 25

## 2024-09-27 PROCEDURE — 70450 CT HEAD/BRAIN W/O DYE: CPT

## 2024-09-27 PROCEDURE — 99215 OFFICE O/P EST HI 40 MIN: CPT | Performed by: FAMILY MEDICINE

## 2024-09-27 PROCEDURE — 93005 ELECTROCARDIOGRAM TRACING: CPT

## 2024-09-27 PROCEDURE — 36415 COLL VENOUS BLD VENIPUNCTURE: CPT | Performed by: EMERGENCY MEDICINE

## 2024-09-27 PROCEDURE — 72125 CT NECK SPINE W/O DYE: CPT

## 2024-09-27 PROCEDURE — 85004 AUTOMATED DIFF WBC COUNT: CPT | Performed by: EMERGENCY MEDICINE

## 2024-09-27 PROCEDURE — 84484 ASSAY OF TROPONIN QUANT: CPT | Performed by: EMERGENCY MEDICINE

## 2024-09-27 ASSESSMENT — COLUMBIA-SUICIDE SEVERITY RATING SCALE - C-SSRS
6. HAVE YOU EVER DONE ANYTHING, STARTED TO DO ANYTHING, OR PREPARED TO DO ANYTHING TO END YOUR LIFE?: NO
2. HAVE YOU ACTUALLY HAD ANY THOUGHTS OF KILLING YOURSELF IN THE PAST MONTH?: NO
1. IN THE PAST MONTH, HAVE YOU WISHED YOU WERE DEAD OR WISHED YOU COULD GO TO SLEEP AND NOT WAKE UP?: NO

## 2024-09-27 ASSESSMENT — ACTIVITIES OF DAILY LIVING (ADL)
ADLS_ACUITY_SCORE: 35

## 2024-09-27 NOTE — PROGRESS NOTES
SUBJECTIVE:    Max Phelps, is a 74 year old male presenting with his wife for the below:     Mechanical fall Sunday night (5 days ago) while carrying 2 gallon pitcher of water,  sticking forehead on aluminium doorframe, sustaining a injury to the forehead. Unwitnessed.  Has not sought medical attention until now. Denies loss of consciousness,subsequent dizziness, change in VA or lightheadedness at time of injury, but did have mild dull frontal headache.    Onset of neck stiffness last night. Now will marked reduction in rom of neck and reoccurrence of headache. Work colleagues and wife have both noted some subtle cognitive changes (making errors at work).     Not taking anticoagulants. Every day smoker. Prior h/o alcohol abuse. Denies any recent alcohol intake.    OBJECTIVE:  Vitals:    09/27/24 1250   BP: (!) 124/94   Pulse: 87   SpO2: 99%   Weight: 60.7 kg (133 lb 12.8 oz)    Body mass index is 18.66 kg/m .  General: no acute distress, cooperative with exam, reduction in range of motion c spine.   Skin: superficial skin loss with healing scab to forehead.    Neuro: grossly intact   Psych: mental status normal, mood and affect appropriate.      ASSESSMENT / PLAN:      Head injury, initial encounter  Nonintractable headache, unspecified chronicity pattern, unspecified headache type  Neck stiffness  Concern for recurrence of ha and neck stiffness with cognitive changes noted by wife and colleagues following blunt trama to head 5 days prior. Discussed need for expedited work up in acute setting and for consideration of neuroimaging to rule out intercranial bleed.      Wife will drive him to the ER directly from clinic.     The longitudinal plan of care for the diagnosis(es)/condition(s) as documented were addressed during this visit. Due to the added complexity in care, I will continue to support Max in the subsequent management and with ongoing continuity of care.

## 2024-09-27 NOTE — ED TRIAGE NOTES
Pt presents from clinic for eval of stiff neck neck, headache, and HTN. Pt has fall one week ago. No sx until yesterday. Denies LOC or blood thinners.      Triage Assessment (Adult)       Row Name 09/27/24 4117          Triage Assessment    Airway WDL WDL        Respiratory WDL    Respiratory WDL WDL        Peripheral/Neurovascular WDL    Peripheral Neurovascular WDL WDL        Cognitive/Neuro/Behavioral WDL    Cognitive/Neuro/Behavioral WDL X  HA-Stiff neck        Pupils (CN II)    Pupil Size Left 3 mm     Pupil Size Right 3 mm        Johnny Coma Scale    Best Eye Response 4-->(E4) spontaneous     Best Motor Response 6-->(M6) obeys commands     Best Verbal Response 5-->(V5) oriented     Lane Coma Scale Score 15                      No

## 2024-09-27 NOTE — ED PROVIDER NOTES
Emergency Department Note      History of Present Illness     Chief Complaint   Fall    HPI   Max Phelps is a 74 year old male who presents after a fall. Patient states that he ran into the corner of a doorframe last . Patient denies any previous symptoms and notes he was not paying up attention when walking. He notes a large gash in his forehead and a large amount of bleeding right after the incident. Patient washed the area with water and placed a band aid on it. He states having a mild headache on  and some swelling and tenderness on Monday but the headache had resolved. Last night patient began experiencing some stiffness in his neck and re-onset of the headache. He says it was hard for him to move his head at all prompting his visit. Patient went to see his PCP today and wife said his blood pressure was 200/110. He says his blood pressure is always high but notes today was abnormal for him. Patient takes a 10 mg lisinopril daily and has not missed a dose. He is not on blood thinners. He works as a .     Independent Historian   Some history provided wife. See HPI.     Review of External Notes   Clinic notes    Past Medical History     Medical History and Problem List   Hypertension     Medications   Lisinopril    Surgical History   Hernia repair    Physical Exam     Patient Vitals for the past 24 hrs:   BP Temp Temp src Pulse Resp SpO2   24 1400 (!) 208/79 -- -- -- -- --   24 1357 -- 98.4  F (36.9  C) Temporal 92 18 99 %     Physical Exam  GENERAL: well developed, pleasant  HEAD: Atraumatic  EYES: pupils reactive, extraocular muscles intact, conjunctivae normal  ENT:  mucus membranes moist  NECK:  trachea midline, normal range of motion  RESPIRATORY: no tachypnea, breath sounds clear to auscultation   CVS: normal S1/S2, no murmurs, intact distal pulses  ABDOMEN: soft, nontender, nondistention  MUSCULOSKELETAL: no deformities  SKIN: warm and dry, no acute rashes or  ulceration  NEURO: GCS 15, cranial nerves intact, alert and oriented x3  PSYCH:  Mood/affect normal     Diagnostics   Lab Results   Labs Ordered and Resulted from Time of ED Arrival to Time of ED Departure   CBC WITH PLATELETS AND DIFFERENTIAL - Abnormal       Result Value    WBC Count 10.4      RBC Count 4.28 (*)     Hemoglobin 13.6      Hematocrit 39.8 (*)     MCV 93      MCH 31.8      MCHC 34.2      RDW 12.8      Platelet Count 224      % Neutrophils 74      % Lymphocytes 15      % Monocytes 9      % Eosinophils 1      % Basophils 1      % Immature Granulocytes 0      NRBCs per 100 WBC 0      Absolute Neutrophils 7.7      Absolute Lymphocytes 1.6      Absolute Monocytes 1.0      Absolute Eosinophils 0.1      Absolute Basophils 0.1      Absolute Immature Granulocytes 0.0      Absolute NRBCs 0.0     BASIC METABOLIC PANEL   TROPONIN T, HIGH SENSITIVITY     Imaging   CT Cervical Spine w/o Contrast   Final Result   IMPRESSION: No evidence of acute fracture or subluxation in the   cervical spine.      AJ ZIMMER MD            SYSTEM ID:  [id number]      CT Head w/o Contrast   Final Result   IMPRESSION: No acute intracranial abnormality.      AJ ZIMMER MD            SYSTEM ID:  [id number]        EKG   ECG taken at 1457, ECG read at 1503  NSR  Normal ECG   Rate 79 bpm. ND interval 162 ms. QRS duration 86 ms. QT/QTc 360/412 ms. P-R-T axes 78 84 50.    Independent Interpretation   None    ED Course    Medications Administered   Medications - No data to display    Procedures   None      Discussion of Management /ED Course   ED Course as of 09/27/24 1700   Fri Sep 27, 2024   1446 I obtained history and examined the patient as noted above.    1658 I rechecked and updated the patient on his imaging.      Additional Documentation  None    Medical Decision Making / Diagnosis   CMS Diagnoses: None    [facility]   Max Wrightsen is a 74 year old male presents with neck stiffness and head injury that occurred couple days ago.   Wife's concern his blood pressure is over 200 and does not go to the doctor.  He denies chest pain shortness of breath he is here mainly because of the head injury and neck stiffness.  CT head neck is negative for intracranial hemorrhage does show advanced arthritis and stenosis but no fracture.  He has no radicular symptoms.  Does show some underlying atherosclerotic disease as well as emphysematous changes as he does smoke.  Discussed with him the importance of blood pressure management and he notes he has had high blood pressure throughout his life.  Electrolytes and troponin are normal from a hypertension workup.  Discussed monitoring his blood pressure at home and following up with his PCP for ongoing blood pressure control.  He can take Tylenol for his neck stiffness which she notes has improved after taking Tylenol.    Disposition   The patient was discharged.     Diagnosis     ICD-10-CM    1. Closed head injury, initial encounter  S09.90XA       2. Cervical strain, initial encounter  S16.1XXA       3. Hypertension, unspecified type  I10          Discharge Medications   New Prescriptions    No medications on file     Scribe Disclosure:  I, Kaur Perry, am serving as a scribe at 3:33 PM on 9/27/2024 to document services personally performed by Kendall Malone MD based on my observations and the provider's statements to me.        Kendall Malone MD  09/27/24 2122

## 2024-09-30 ENCOUNTER — TELEPHONE (OUTPATIENT)
Dept: FAMILY MEDICINE | Facility: CLINIC | Age: 74
End: 2024-09-30

## 2024-09-30 NOTE — TELEPHONE ENCOUNTER
"ED / Discharge Outreach Protocol    Patient Contact    Attempt # 1    Was call answered?  Yes.  \"May I please speak with <patient name>\"  Is patient available?   Yes   Patient will call back to schedule. Needs to look at calendar.    "

## 2024-10-02 ENCOUNTER — OFFICE VISIT (OUTPATIENT)
Dept: FAMILY MEDICINE | Facility: CLINIC | Age: 74
End: 2024-10-02

## 2024-10-02 VITALS
OXYGEN SATURATION: 99 % | BODY MASS INDEX: 18.69 KG/M2 | SYSTOLIC BLOOD PRESSURE: 196 MMHG | WEIGHT: 134 LBS | HEART RATE: 83 BPM | DIASTOLIC BLOOD PRESSURE: 91 MMHG

## 2024-10-02 DIAGNOSIS — E87.1 HYPONATREMIA: ICD-10-CM

## 2024-10-02 DIAGNOSIS — R73.9 ELEVATED RANDOM BLOOD GLUCOSE LEVEL: ICD-10-CM

## 2024-10-02 DIAGNOSIS — Z72.0 TOBACCO ABUSE: ICD-10-CM

## 2024-10-02 DIAGNOSIS — I10 BENIGN ESSENTIAL HYPERTENSION: Primary | ICD-10-CM

## 2024-10-02 DIAGNOSIS — J43.9 PULMONARY EMPHYSEMA, UNSPECIFIED EMPHYSEMA TYPE (H): ICD-10-CM

## 2024-10-02 DIAGNOSIS — I65.23 CALCIFICATION OF BOTH CAROTID ARTERIES: ICD-10-CM

## 2024-10-02 LAB
ANION GAP SERPL CALCULATED.3IONS-SCNC: 13 MMOL/L (ref 7–15)
BUN SERPL-MCNC: 8.7 MG/DL (ref 8–23)
CALCIUM SERPL-MCNC: 9.2 MG/DL (ref 8.8–10.4)
CHLORIDE SERPL-SCNC: 93 MMOL/L (ref 98–107)
CHOLEST SERPL-MCNC: 169 MG/DL
CREAT SERPL-MCNC: 0.58 MG/DL (ref 0.67–1.17)
EGFRCR SERPLBLD CKD-EPI 2021: >90 ML/MIN/1.73M2
EST. AVERAGE GLUCOSE BLD GHB EST-MCNC: 146 MG/DL
FASTING STATUS PATIENT QL REPORTED: NO
FASTING STATUS PATIENT QL REPORTED: NO
GLUCOSE SERPL-MCNC: 118 MG/DL (ref 70–99)
HBA1C MFR BLD: 6.7 %
HCO3 SERPL-SCNC: 23 MMOL/L (ref 22–29)
HDLC SERPL-MCNC: 50 MG/DL
LDLC SERPL CALC-MCNC: 109 MG/DL
NONHDLC SERPL-MCNC: 119 MG/DL
POTASSIUM SERPL-SCNC: 4.1 MMOL/L (ref 3.4–5.3)
SODIUM SERPL-SCNC: 129 MMOL/L (ref 135–145)
TRIGL SERPL-MCNC: 50 MG/DL

## 2024-10-02 PROCEDURE — G2211 COMPLEX E/M VISIT ADD ON: HCPCS | Performed by: FAMILY MEDICINE

## 2024-10-02 PROCEDURE — 83036 HEMOGLOBIN GLYCOSYLATED A1C: CPT | Performed by: FAMILY MEDICINE

## 2024-10-02 PROCEDURE — 80048 BASIC METABOLIC PNL TOTAL CA: CPT | Mod: 90 | Performed by: FAMILY MEDICINE

## 2024-10-02 PROCEDURE — 99214 OFFICE O/P EST MOD 30 MIN: CPT | Performed by: FAMILY MEDICINE

## 2024-10-02 PROCEDURE — 36415 COLL VENOUS BLD VENIPUNCTURE: CPT | Performed by: FAMILY MEDICINE

## 2024-10-02 PROCEDURE — 80061 LIPID PANEL: CPT | Mod: QW | Performed by: FAMILY MEDICINE

## 2024-10-02 RX ORDER — LISINOPRIL AND HYDROCHLOROTHIAZIDE 10; 12.5 MG/1; MG/1
1 TABLET ORAL DAILY
Status: CANCELLED | OUTPATIENT
Start: 2024-10-02

## 2024-10-02 NOTE — PROGRESS NOTES
"SUBJECTIVE:    Max Phelps, is a 74 year old male presenting for the below:     -Hypertension : lisinopril 10 mg daily. Blood pressure inadequately controlled for some time. Of note, sodium 129 in ER 9/27 (presented for trauma to head : neuro imaging within normal limits).    BP Readings from Last 6 Encounters:   10/02/24 (!) 196/91   09/27/24 (!) 215/83   09/27/24 (!) 124/94   06/06/24 (!) 156/78   10/20/22 (!) 159/76   07/27/21 (!) 180/80     Wife, who presents with him today, states, home blood pressure readings in range 200-130/100-72.     Tobacco abuse.  Smokes 2  per day. Multiple failed quit attempts in the past. \"Smokers cough\". Denies shortness of breath on exertion. Has no current desire to quit, but would like to cut down on number smoked per day. CT C spine during recent ER presentation \"Apical pulmonary emphysema with presumed chronic scarring and atelectasis\".    OBJECTIVE:  Vitals:    10/02/24 1433 10/02/24 1435   BP: (!) 201/91 (!) 196/91   Pulse: 83    SpO2: 99%    Weight: 60.8 kg (134 lb)     Body mass index is 18.69 kg/m .  General: no acute distress, cooperative with exam.    ASSESSMENT / PLAN:      Benign essential hypertension  Inadequately controlled blood pressure on low dose ACEi.   Likely will need minimum 2 antihypertensive agents for improve hypertension control.   -recheck sodium : will limit blood pressure med options if this is reproducible on repeat lab testing. (sodium 129 in ER, last BMP prior to that 2021 wnl)   -will contact patient on antihypertensive meds choice once this resulted.     Elevated random blood glucose level  -     Hemoglobin A1c; Future  -     VENOUS COLLECTION    Hyponatremia  Recheck as above  -     Basic metabolic panel; Future  -     VENOUS COLLECTION    Pulmonary emphysema, unspecified emphysema type (H)  CT C spine during recent ER presentation \"Apical pulmonary emphysema with presumed chronic scarring and atelectasis\". Wife endorses 'smokers " cough', patient denies symptoms.     Tobacco abuse  Importance of quitting and ongoing support for future quit attempts discussed. Declines nicotine replacement or other assistance with quitting at this time.     Calcification of both carotid arteries  Also noted incidentally on CT C spine during recent ER presentation.   Not taking statin medication. Overdue AAA screening.  -consider statin start, AAA screening and carotid artery USS.   -     Lipid Profile; Future    Of note, no annual physical or routine preventative care for some time.     The longitudinal plan of care for the diagnosis(es)/condition(s) as documented were addressed during this visit. Due to the added complexity in care, I will continue to support Max in the subsequent management and with ongoing continuity of care.

## 2024-10-09 DIAGNOSIS — I10 ESSENTIAL HYPERTENSION: Primary | ICD-10-CM

## 2024-10-09 RX ORDER — AMLODIPINE BESYLATE 10 MG/1
10 TABLET ORAL DAILY
Qty: 90 TABLET | Refills: 3 | Status: SHIPPED | OUTPATIENT
Start: 2024-10-09

## 2024-10-11 ENCOUNTER — OFFICE VISIT (OUTPATIENT)
Dept: FAMILY MEDICINE | Facility: CLINIC | Age: 74
End: 2024-10-11

## 2024-10-11 VITALS
OXYGEN SATURATION: 94 % | HEART RATE: 95 BPM | BODY MASS INDEX: 18.27 KG/M2 | WEIGHT: 131 LBS | SYSTOLIC BLOOD PRESSURE: 178 MMHG | DIASTOLIC BLOOD PRESSURE: 101 MMHG

## 2024-10-11 DIAGNOSIS — E87.1 HYPONATREMIA: Primary | ICD-10-CM

## 2024-10-11 DIAGNOSIS — Z79.4 TYPE 2 DIABETES MELLITUS WITHOUT COMPLICATION, WITH LONG-TERM CURRENT USE OF INSULIN (H): ICD-10-CM

## 2024-10-11 DIAGNOSIS — E11.9 TYPE 2 DIABETES MELLITUS WITHOUT COMPLICATION, WITH LONG-TERM CURRENT USE OF INSULIN (H): ICD-10-CM

## 2024-10-11 DIAGNOSIS — J43.9 PULMONARY EMPHYSEMA, UNSPECIFIED EMPHYSEMA TYPE (H): ICD-10-CM

## 2024-10-11 DIAGNOSIS — Z13.6 SCREENING FOR AAA (ABDOMINAL AORTIC ANEURYSM): ICD-10-CM

## 2024-10-11 DIAGNOSIS — E78.5 HYPERLIPIDEMIA LDL GOAL <70: ICD-10-CM

## 2024-10-11 DIAGNOSIS — I65.23 CALCIFICATION OF BOTH CAROTID ARTERIES: ICD-10-CM

## 2024-10-11 LAB
ANION GAP SERPL CALCULATED.3IONS-SCNC: 9 MMOL/L (ref 7–15)
BUN SERPL-MCNC: 9.4 MG/DL (ref 8–23)
CALCIUM SERPL-MCNC: 9.1 MG/DL (ref 8.8–10.4)
CHLORIDE SERPL-SCNC: 91 MMOL/L (ref 98–107)
CREAT SERPL-MCNC: 0.61 MG/DL (ref 0.67–1.17)
EGFRCR SERPLBLD CKD-EPI 2021: >90 ML/MIN/1.73M2
FASTING STATUS PATIENT QL REPORTED: NO
GLUCOSE SERPL-MCNC: 129 MG/DL (ref 70–99)
HCO3 SERPL-SCNC: 27 MMOL/L (ref 22–29)
OSMOLALITY SERPL: 271 MMOL/KG (ref 280–301)
OSMOLALITY UR: 474 MMOL/KG (ref 100–1200)
POTASSIUM SERPL-SCNC: 4.5 MMOL/L (ref 3.4–5.3)
SODIUM SERPL-SCNC: 127 MMOL/L (ref 135–145)
SODIUM UR-SCNC: 66 MMOL/L

## 2024-10-11 PROCEDURE — 99214 OFFICE O/P EST MOD 30 MIN: CPT | Performed by: FAMILY MEDICINE

## 2024-10-11 PROCEDURE — 84300 ASSAY OF URINE SODIUM: CPT | Mod: 90 | Performed by: FAMILY MEDICINE

## 2024-10-11 PROCEDURE — 83935 ASSAY OF URINE OSMOLALITY: CPT | Mod: 90 | Performed by: FAMILY MEDICINE

## 2024-10-11 PROCEDURE — 80048 BASIC METABOLIC PNL TOTAL CA: CPT | Mod: 90 | Performed by: FAMILY MEDICINE

## 2024-10-11 PROCEDURE — 83930 ASSAY OF BLOOD OSMOLALITY: CPT | Mod: 90 | Performed by: FAMILY MEDICINE

## 2024-10-11 PROCEDURE — 36415 COLL VENOUS BLD VENIPUNCTURE: CPT | Performed by: FAMILY MEDICINE

## 2024-10-11 PROCEDURE — G2211 COMPLEX E/M VISIT ADD ON: HCPCS | Performed by: FAMILY MEDICINE

## 2024-10-11 RX ORDER — ROSUVASTATIN CALCIUM 20 MG/1
20 TABLET, COATED ORAL DAILY
Qty: 90 TABLET | Refills: 3 | Status: SHIPPED | OUTPATIENT
Start: 2024-10-11

## 2024-10-11 NOTE — PROGRESS NOTES
"SUBJECTIVE:    Max Phelps, is a 74 year old male presenting for the below:     -Hypertension : switching from lisinopril to Norvasc due to recently detected hyponatremia with unclear aetiology (no primary care for many years). Reports home blood pressure readings in range 169/90    -hyponatremia : unclear aetiology (no primary care for many years) : labs for urinary and serum osmolality and sodium to be drawn today. Denies beer consumption. 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    -new Dx of type 2 DM. Strong Fhx of 2TDM : sister, 2 daughters, mother.     OBJECTIVE:  Vitals:    10/11/24 1131 10/11/24 1133   BP: (!) 180/99 (!) 178/101   Pulse: 95    SpO2: 94%    Weight: 59.4 kg (131 lb)     Body mass index is 18.27 kg/m .  General: no acute distress, cooperative with exam.    Lab Results   Component Value Date    A1C 6.7 10/02/2024     ASSESSMENT / PLAN:      Benign essential hypertension  -stop lisinopril  -start norvasc 10 mg daily : likely will need 2nd antihypertensive : working up aetiology of hyponatremia first : loop, ACEi/ARB, spironolactone all with risk of worsening hyponatremia.     Hyponatremia  -     Basic metabolic panel; Future  -     Sodium random urine; Future  -     Osmolality urine; Future  -     Cancel: OSMOLALITY, SERUM  -     Osmolality; Future  -     VENOUS COLLECTION    Pulmonary emphysema, unspecified emphysema type (H)  Ongoing tobacco abuse : ongoing declines quitting : is trying to cut down.   CT C spine during recent ER presentation \"Apical pulmonary emphysema with presumed chronic scarring and atelectasis\". Wife endorses 'smokers cough', patient denies symptoms.     Calcification of both carotid arteries  Also noted incidentally on CT C spine during recent ER presentation.   -discussed statin start today: in agreement.   -discussed AAA screening : in agreement.     Hyperlipidemia LDL goal <70  The 10-year ASCVD risk score (Rosa DK, et al., 2019) is: 64.2%  Open to starting statin for ASCVD risk " reduction.   -     rosuvastatin (CRESTOR) 20 MG tablet; Take 1 tablet (20 mg) by mouth daily.  -     VENOUS COLLECTION    Screening for AAA (abdominal aortic aneurysm)  -     US Abdominal Aorta Imaging; Future  -     VENOUS COLLECTION    Type 2 diabetes mellitus without complication, with long-term current use of insulin (H)  Strong Fhx / genetic component   Starting statin today.   Consider metformin start for risk reduction at near future appointment.       Of note, no annual physical or routine preventative care for some significant time. Incremental medication starts and discussion on elements of health so as not to overwhelm patient .     Follow up 2 weeks  -recheck bp  -consider carotid artery USS discussion at future appointment   -consider starting metformin  -consider starting ASA    The longitudinal plan of care for the diagnosis(es)/condition(s) as documented were addressed during this visit. Due to the added complexity in care, I will continue to support Max in the subsequent management and with ongoing continuity of care.

## 2024-10-11 NOTE — PATIENT INSTRUCTIONS
- you are going to stop taking the lisinopril and start taking Norvasc 10 mg daily  - continue checking your blood pressure daily.   - we have drawn your labs today to help us figure out why your sodium is low    - we started a statin medication to reduce your risk of heart attacks and stoke : Crestor 20 mg daily.

## 2024-10-17 ENCOUNTER — TELEPHONE (OUTPATIENT)
Dept: FAMILY MEDICINE | Facility: CLINIC | Age: 74
End: 2024-10-17

## 2024-10-17 DIAGNOSIS — E87.1 HYPONATREMIA: Primary | ICD-10-CM

## 2024-10-17 DIAGNOSIS — Z72.0 TOBACCO ABUSE: ICD-10-CM

## 2024-10-17 DIAGNOSIS — E22.2 SIADH (SYNDROME OF INAPPROPRIATE ADH PRODUCTION) (H): ICD-10-CM

## 2024-10-17 NOTE — TELEPHONE ENCOUNTER
Called patient to discuss concern for thus far unexplained hyponatremia with lab testing pointing towards SIADH. With heavy tobacco abuse, concern for small cell lung cancer. Patient in agreement with CT lung.     Hyponatremia  Tobacco abuse  SIADH (syndrome of inappropriate ADH production) (H)  -     CT Chest w Contrast; Future

## 2024-10-23 ENCOUNTER — ANCILLARY PROCEDURE (OUTPATIENT)
Dept: ULTRASOUND IMAGING | Facility: CLINIC | Age: 74
End: 2024-10-23
Attending: FAMILY MEDICINE
Payer: COMMERCIAL

## 2024-10-23 DIAGNOSIS — Z13.6 SCREENING FOR AAA (ABDOMINAL AORTIC ANEURYSM): ICD-10-CM

## 2024-10-23 PROCEDURE — 76775 US EXAM ABDO BACK WALL LIM: CPT

## 2024-10-29 ENCOUNTER — OFFICE VISIT (OUTPATIENT)
Dept: FAMILY MEDICINE | Facility: CLINIC | Age: 74
End: 2024-10-29

## 2024-10-29 VITALS
WEIGHT: 129 LBS | SYSTOLIC BLOOD PRESSURE: 142 MMHG | HEART RATE: 90 BPM | DIASTOLIC BLOOD PRESSURE: 80 MMHG | BODY MASS INDEX: 17.99 KG/M2 | OXYGEN SATURATION: 98 %

## 2024-10-29 DIAGNOSIS — I10 ESSENTIAL HYPERTENSION: Primary | ICD-10-CM

## 2024-10-29 DIAGNOSIS — R73.09 ELEVATED HEMOGLOBIN A1C: ICD-10-CM

## 2024-10-29 DIAGNOSIS — Z72.0 TOBACCO ABUSE: ICD-10-CM

## 2024-10-29 DIAGNOSIS — I65.23 CALCIFICATION OF BOTH CAROTID ARTERIES: ICD-10-CM

## 2024-10-29 DIAGNOSIS — E22.2 SIADH (SYNDROME OF INAPPROPRIATE ADH PRODUCTION) (H): ICD-10-CM

## 2024-10-29 DIAGNOSIS — I99.9 VASCULOPATHY: ICD-10-CM

## 2024-10-29 DIAGNOSIS — E87.1 HYPONATREMIA: ICD-10-CM

## 2024-10-29 LAB
ANION GAP SERPL CALCULATED.3IONS-SCNC: 4 MMOL/L (ref 7–15)
BUN SERPL-MCNC: 11.2 MG/DL (ref 8–23)
CALCIUM SERPL-MCNC: 9.3 MG/DL (ref 8.8–10.4)
CHLORIDE SERPL-SCNC: 95 MMOL/L (ref 98–107)
CREAT SERPL-MCNC: 0.61 MG/DL (ref 0.67–1.17)
EGFRCR SERPLBLD CKD-EPI 2021: >90 ML/MIN/1.73M2
FASTING STATUS PATIENT QL REPORTED: ABNORMAL
GLUCOSE SERPL-MCNC: 117 MG/DL (ref 70–99)
HCO3 SERPL-SCNC: 32 MMOL/L (ref 22–29)
POTASSIUM SERPL-SCNC: 4 MMOL/L (ref 3.4–5.3)
SODIUM SERPL-SCNC: 131 MMOL/L (ref 135–145)

## 2024-10-29 PROCEDURE — G2211 COMPLEX E/M VISIT ADD ON: HCPCS | Performed by: FAMILY MEDICINE

## 2024-10-29 PROCEDURE — 99214 OFFICE O/P EST MOD 30 MIN: CPT | Performed by: FAMILY MEDICINE

## 2024-10-29 PROCEDURE — 36415 COLL VENOUS BLD VENIPUNCTURE: CPT | Performed by: FAMILY MEDICINE

## 2024-10-29 PROCEDURE — 80048 BASIC METABOLIC PNL TOTAL CA: CPT | Mod: 90 | Performed by: FAMILY MEDICINE

## 2024-10-29 NOTE — PROGRESS NOTES
SUBJECTIVE:    Max Phelps, is a 74 year old male presenting for the below:     -Hypertension : norvasc 10 mg daily (lisinopril stopped due to hyponatremia).    OBJECTIVE:  Vitals:    10/29/24 1534 10/29/24 1537 10/29/24 1553   BP: (!) 175/87 (!) 175/101 (!) 142/80   Pulse: 90     SpO2: 98%     Weight: 58.5 kg (129 lb)      Body mass index is 17.99 kg/m .  General: no acute distress, cooperative with exam.  Psych: mental status normal, mood and affect appropriate.      The 10-year ASCVD risk score (Rosa SCHMIDT, et al., 2019) is: 63.1%    Values used to calculate the score:      Age: 74 years      Sex: Male      Is Non- : No      Diabetic: Yes      Tobacco smoker: Yes      Systolic Blood Pressure: 175 mmHg      Is BP treated: Yes      HDL Cholesterol: 50 mg/dL      Total Cholesterol: 169 mg/dL      Lab Results   Component Value Date    A1C 6.7 10/02/2024       ASSESSMENT / PLAN:        Essential hypertension  Norvasc 10 mg daily.   Borderline continued elevated blood pressure   Monitor while working up hyponatremia.     Tobacco abuse  Ongoing heavy and chronic tobacco abuse. declines quitting : is trying to cut down.   -     VENOUS COLLECTION    Hyponatremia  SIADH (syndrome of inappropriate ADH production) (H)  Thus far unexplained. lab testing pointing towards SIADH. With heavy tobacco abuse, concern for small cell lung cancer. Patient in agreement with CT lung:has yet to schedule.   -     VENOUS COLLECTION  -     Basic metabolic panel; Future  -     VENOUS COLLECTION    Calcification of both carotid arteries  Noted incidentally on CT C spine during recent ER presentation.   -need dedicated USS carotid arteries.  -     US Carotid Bilateral; Future  -     VENOUS COLLECTION    Elevated hemoglobin A1c  Plan for repeat A1c in 3 months from first to confirm diagnosis of T2DM     Vasculopathy   ASCVD 63.1%.   Taking statin. Managing hypertension  After discussion, will start ASA 81 mg  She can switch to LoEstrin 1/20 and skip the placebo pills for 3 months  daily    Follow up:  Following CT scan chest results.       The longitudinal plan of care for the diagnosis(es)/condition(s) as documented were addressed during this visit. Due to the added complexity in care, I will continue to support Max in the subsequent management and with ongoing continuity of care.

## 2024-10-29 NOTE — LETTER
October 30, 2024      Max Phelps  [address] [place] [address]            Dear Max,     It was marcie to see you at your recent appointment.     Here are your lab results.     Your sodium continues to be a little low, but has increased some since the last check 2 weeks ago. Make sure to schedule the CT scan of the chest and ultrasound scan of the carotid arteries. We will plan next steps once we have those imaging results.     All other labs are within expected ranges.     Please let us know if you have any questions.       Resulted Orders   Basic metabolic panel   Result Value Ref Range    Sodium 131 (L) 135 - 145 mmol/L    Potassium 4.0 3.4 - 5.3 mmol/L    Chloride 95 (L) 98 - 107 mmol/L    Carbon Dioxide (CO2) 32 (H) 22 - 29 mmol/L    Anion Gap 4 (L) 7 - 15 mmol/L    Urea Nitrogen 11.2 8.0 - 23.0 mg/dL    Creatinine 0.61 (L) 0.67 - 1.17 mg/dL    GFR Estimate >90 >60 mL/min/1.73m2      Comment:      eGFR calculated using 2021 CKD-EPI equation.    Calcium 9.3 8.8 - 10.4 mg/dL      Comment:      Reference intervals for this test were updated on 7/16/2024 to reflect our healthy population more accurately. There may be differences in the flagging of prior results with similar values performed with this method. Those prior results can be interpreted in the context of the updated reference intervals.    Glucose 117 (H) 70 - 99 mg/dL    Patient Fasting > 8hrs? Unknown        If you have any questions or concerns, please call the clinic at the number listed above.       Sincerely,      Abida Farmer MD

## 2024-10-29 NOTE — PATIENT INSTRUCTIONS
- start taking 81 mg of aspirin once daily  - make sure to schedule the CT scan of the lungs  - schedule the carotid artery ultrasound scan.

## 2024-11-13 ENCOUNTER — ANCILLARY PROCEDURE (OUTPATIENT)
Dept: CT IMAGING | Facility: CLINIC | Age: 74
End: 2024-11-13
Attending: FAMILY MEDICINE
Payer: MEDICARE

## 2024-11-13 ENCOUNTER — ANCILLARY PROCEDURE (OUTPATIENT)
Dept: ULTRASOUND IMAGING | Facility: CLINIC | Age: 74
End: 2024-11-13
Attending: FAMILY MEDICINE
Payer: MEDICARE

## 2024-11-13 DIAGNOSIS — Z72.0 TOBACCO ABUSE: ICD-10-CM

## 2024-11-13 DIAGNOSIS — E87.1 HYPONATREMIA: ICD-10-CM

## 2024-11-13 DIAGNOSIS — I65.23 CALCIFICATION OF BOTH CAROTID ARTERIES: ICD-10-CM

## 2024-11-13 DIAGNOSIS — E22.2 SIADH (SYNDROME OF INAPPROPRIATE ADH PRODUCTION) (H): ICD-10-CM

## 2024-11-13 PROCEDURE — 93880 EXTRACRANIAL BILAT STUDY: CPT

## 2024-11-13 PROCEDURE — G1010 CDSM STANSON: HCPCS

## 2024-11-13 PROCEDURE — 71260 CT THORAX DX C+: CPT | Mod: ME

## 2024-11-13 PROCEDURE — 250N000009 HC RX 250: Performed by: FAMILY MEDICINE

## 2024-11-13 PROCEDURE — 250N000011 HC RX IP 250 OP 636: Performed by: FAMILY MEDICINE

## 2024-11-13 RX ORDER — IOPAMIDOL 755 MG/ML
64 INJECTION, SOLUTION INTRAVASCULAR ONCE
Status: COMPLETED | OUTPATIENT
Start: 2024-11-13 | End: 2024-11-13

## 2024-11-13 RX ADMIN — SODIUM CHLORIDE 40 ML: 9 INJECTION, SOLUTION INTRAVENOUS at 10:43

## 2024-11-13 RX ADMIN — IOPAMIDOL 64 ML: 755 INJECTION, SOLUTION INTRAVENOUS at 10:43

## 2024-11-14 ENCOUNTER — TELEPHONE (OUTPATIENT)
Dept: FAMILY MEDICINE | Facility: CLINIC | Age: 74
End: 2024-11-14

## 2024-11-14 DIAGNOSIS — I25.84 CORONARY ARTERY DISEASE DUE TO CALCIFIED CORONARY LESION: ICD-10-CM

## 2024-11-14 DIAGNOSIS — I65.23 BILATERAL CAROTID ARTERY STENOSIS: Primary | ICD-10-CM

## 2024-11-14 DIAGNOSIS — I25.10 CORONARY ARTERY DISEASE DUE TO CALCIFIED CORONARY LESION: ICD-10-CM

## 2024-11-14 NOTE — TELEPHONE ENCOUNTER
Called patient with results:    CT chest:   -No evidence of lung cancer  -severe emphysema : (suspect this is cause of hyponatremia)  -severe CAD (patient open to stress echocardiogram)    US doppler carotid arteries  -bilateral severe stenosis >70%. Possibly >90% left side (open to meeting with vascular surgery)      Is taking statin, blood pressure managed and taking ASA 81 mg daily.   All questions answered. Again encouraged to quit smoking. Declines assistance with patches or gum at this time.       Bilateral carotid artery stenosis  -     Vascular Surgery Referral; Future    Coronary artery disease due to calcified coronary lesion  -     Echocardiogram Exercise Stress; Future

## 2024-11-15 ENCOUNTER — TELEPHONE (OUTPATIENT)
Dept: OTHER | Facility: CLINIC | Age: 74
End: 2024-11-15

## 2024-11-15 DIAGNOSIS — I65.23 BILATERAL CAROTID ARTERY STENOSIS: Primary | ICD-10-CM

## 2024-11-15 NOTE — TELEPHONE ENCOUNTER
Referral received via [employer] on 11/15/24.    Referred by Abida Farmer MD for bilateral carotid stenosis    Previous imaging completed (pertinent to referral):  Bilateral carotid US    Routing to scheduling to coordinate the following:  CTA head neck  NEW VASCULAR PATIENT consult with Vascular Surgery  Please schedule this within 1-2 days    Appt note:  Referred by Abida Farmer MD for bilateral carotid stenosis    Melva WILLIAM, RN    Formerly named [facility]  Office: [phone]  Fax: [phone]

## 2024-11-15 NOTE — TELEPHONE ENCOUNTER
LM on mobile number for patient to call us back to schedule:    CTA head neck ([id number])  NEW VASCULAR PATIENT consult with Vascular Surgery  Please schedule this within 1-2 days     Appt note:  Referred by Abida Farmer MD for bilateral carotid stenosis     Unable to LM on home number as it hung up.

## 2024-11-25 NOTE — TELEPHONE ENCOUNTER
Called patient. He does not wish to schedule at this time. He has [facility] # and will call when ready. Please remove from call list.

## 2025-04-09 ENCOUNTER — OFFICE VISIT (OUTPATIENT)
Dept: FAMILY MEDICINE | Facility: CLINIC | Age: 75
End: 2025-04-09

## 2025-04-09 VITALS
HEIGHT: 70 IN | WEIGHT: 136 LBS | HEART RATE: 89 BPM | BODY MASS INDEX: 19.47 KG/M2 | SYSTOLIC BLOOD PRESSURE: 171 MMHG | DIASTOLIC BLOOD PRESSURE: 89 MMHG | OXYGEN SATURATION: 96 %

## 2025-04-09 DIAGNOSIS — J43.9 PULMONARY EMPHYSEMA, UNSPECIFIED EMPHYSEMA TYPE (H): ICD-10-CM

## 2025-04-09 DIAGNOSIS — H61.21 IMPACTED CERUMEN OF RIGHT EAR: Primary | ICD-10-CM

## 2025-04-09 PROCEDURE — 3079F DIAST BP 80-89 MM HG: CPT | Performed by: FAMILY MEDICINE

## 2025-04-09 PROCEDURE — 99213 OFFICE O/P EST LOW 20 MIN: CPT | Mod: 25 | Performed by: FAMILY MEDICINE

## 2025-04-09 PROCEDURE — 3077F SYST BP >= 140 MM HG: CPT | Performed by: FAMILY MEDICINE

## 2025-04-09 PROCEDURE — 69209 REMOVE IMPACTED EAR WAX UNI: CPT | Performed by: FAMILY MEDICINE

## 2025-04-14 PROBLEM — J43.9 PULMONARY EMPHYSEMA, UNSPECIFIED EMPHYSEMA TYPE (H): Status: ACTIVE | Noted: 2025-04-14

## 2025-04-14 NOTE — PROGRESS NOTES
"SUBJECTIVE:   Max Phelps is a 74 year old male presenting with a complaint of decreased hearing. Symptoms had a gradual onset   a while ago .  The symptoms are moderate  in severity.  Other Current and Associated symptoms: none.    Patient Active Problem List   Diagnosis    Problem with, hearing    Bilateral impacted cerumen    Essential hypertension    Tobacco abuse     Current Outpatient Medications   Medication Sig Dispense Refill    amLODIPine (NORVASC) 10 MG tablet Take 1 tablet (10 mg) by mouth daily. 90 tablet 3    rosuvastatin (CRESTOR) 20 MG tablet Take 1 tablet (20 mg) by mouth daily. 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  Wax is removed by syringing and manual debridement.  After the ear canals and TM's are noted to be normal.    Nose and mouth without ulcers, erythema or lesions  NECK: supple, nontender, no lymphadenopathy  RESP: lungs clear to auscultation - no rales, rhonchi or wheezes  NEURO: Alert & Oriented  PSYCH: Normal Mood & Affect       Assesment and Plan:   (H61.21) Impacted cerumen of right ear  (primary encounter diagnosis)  Comment:   Plan: [place] REMOVAL IMPACTED CERUMEN IRRIGATION/LVG         UNILAT               [id number]    Answers submitted by the patient for this visit:  General Questionnaire (Submitted on 4/9/2025)  Chief Complaint: Chronic problems general questions HPI Form  How many days per week do you miss taking your " medication?: 0  General Concern (Submitted on 4/9/2025)  Chief Complaint: Chronic problems general questions HPI Form  What is the reason for your visit today?: ears  When did your symptoms begin?: 1-2 weeks ago  Questionnaire about: Chronic problems general questions HPI Form (Submitted on 4/9/2025)  Chief Complaint: Chronic problems general questions HPI Form